# Patient Record
Sex: FEMALE | Race: WHITE | Employment: OTHER | URBAN - METROPOLITAN AREA
[De-identification: names, ages, dates, MRNs, and addresses within clinical notes are randomized per-mention and may not be internally consistent; named-entity substitution may affect disease eponyms.]

---

## 2020-03-11 ENCOUNTER — HOSPITAL ENCOUNTER (EMERGENCY)
Age: 75
Discharge: HOME | End: 2020-03-11
Attending: EMERGENCY MEDICINE
Payer: MEDICARE

## 2020-03-11 VITALS
HEIGHT: 61 IN | OXYGEN SATURATION: 97 % | WEIGHT: 129 LBS | BODY MASS INDEX: 24.35 KG/M2 | DIASTOLIC BLOOD PRESSURE: 74 MMHG | TEMPERATURE: 97.4 F | SYSTOLIC BLOOD PRESSURE: 146 MMHG | HEART RATE: 67 BPM | RESPIRATION RATE: 14 BRPM

## 2020-03-11 PROCEDURE — 99283 EMERGENCY DEPT VISIT LOW MDM: CPT

## 2020-03-11 RX ORDER — HYDROCODONE BITARTRATE AND ACETAMINOPHEN 5; 325 MG/1; MG/1
1 TABLET ORAL EVERY 6 HOURS PRN
Qty: 20 TABLET | Refills: 0 | Status: SHIPPED | OUTPATIENT
Start: 2020-03-11 | End: 2020-03-14

## 2020-03-11 ASSESSMENT — PAIN DESCRIPTION - ORIENTATION: ORIENTATION: MID

## 2020-03-11 ASSESSMENT — PAIN DESCRIPTION - FREQUENCY: FREQUENCY: CONTINUOUS

## 2020-03-11 ASSESSMENT — PAIN SCALES - GENERAL: PAINLEVEL_OUTOF10: 7

## 2020-03-11 ASSESSMENT — PAIN DESCRIPTION - DESCRIPTORS: DESCRIPTORS: BURNING

## 2020-03-11 ASSESSMENT — PAIN DESCRIPTION - LOCATION: LOCATION: FACE

## 2020-03-11 ASSESSMENT — PAIN DESCRIPTION - ONSET: ONSET: SUDDEN

## 2020-03-11 ASSESSMENT — PAIN DESCRIPTION - PROGRESSION: CLINICAL_PROGRESSION: NOT CHANGED

## 2020-03-11 ASSESSMENT — PAIN DESCRIPTION - PAIN TYPE: TYPE: ACUTE PAIN

## 2020-03-11 NOTE — ED PROVIDER NOTES
HPI:  3/11/20,   Time: 2:10 PM EDT         Stevenson Pham is a 76 y.o. female presenting to the ED for a thermal burn to the forehead, beginning 1d ago. The complaint has been persistent, moderate in severity, and worsened by nothing. The patient burned herself with hot steam at home yesterday and she could not sleep last night because of the pain    ROS:   Pertinent positives and negatives are stated within HPI, all other systems reviewed and are negative.  --------------------------------------------- PAST HISTORY ---------------------------------------------  Past Medical History:  has a past medical history of Hypertension. Past Surgical History:  has a past surgical history that includes Colonoscopy; Rotator cuff repair (Right); Hysterectomy; Tubal ligation; and Appendectomy. Social History:  reports that she has never smoked. She does not have any smokeless tobacco history on file. She reports that she does not drink alcohol or use drugs. Family History: family history is not on file. The patients home medications have been reviewed. Allergies: Pcn [penicillins]    -------------------------------------------------- RESULTS -------------------------------------------------  All laboratory and radiology results have been personally reviewed by myself   LABS:  No results found for this visit on 03/11/20. RADIOLOGY:  Interpreted by Radiologist.  No orders to display       ------------------------- NURSING NOTES AND VITALS REVIEWED ---------------------------   The nursing notes within the ED encounter and vital signs as below have been reviewed.    BP (!) 150/80   Pulse 70   Temp 97.4 °F (36.3 °C) (Oral)   Resp 14   Ht 5' 1\" (1.549 m)   Wt 129 lb (58.5 kg)   SpO2 97%   BMI 24.37 kg/m²   Oxygen Saturation Interpretation: Normal      ---------------------------------------------------PHYSICAL EXAM--------------------------------------      Constitutional/General: Alert and oriented x3, well appearing, non toxic in NAD  Head: NC/AT. There is a 3 x 4 cm second-degree burn on the upper part of the forehead  Eyes: PERRL, EOMI    Extremities: Moves all extremities x 4. Warm and well perfused  Skin: warm and dry without rash  Neurologic: GCS 15,  Psych: Normal Affect      ------------------------------ ED COURSE/MEDICAL DECISION MAKING----------------------  Medications - No data to display      Medical Decision Making:          Counseling: The emergency provider has spoken with the patient and discussed todays results, in addition to providing specific details for the plan of care and counseling regarding the diagnosis and prognosis. Questions are answered at this time and they are agreeable with the plan.      --------------------------------- IMPRESSION AND DISPOSITION ---------------------------------    IMPRESSION  1.  Superficial burn of forehead, initial encounter        DISPOSITION  Disposition: Discharge to home  Patient condition is stable                  Mica Chau MD  03/11/20 1367

## 2023-03-05 PROBLEM — G54.2 CERVICAL NEUROPATHY: Status: ACTIVE | Noted: 2023-03-05

## 2023-03-05 PROBLEM — M17.11 PRIMARY OSTEOARTHRITIS OF RIGHT KNEE: Status: ACTIVE | Noted: 2023-03-05

## 2023-03-05 PROBLEM — K59.00 CONSTIPATION: Status: ACTIVE | Noted: 2023-03-05

## 2023-03-05 PROBLEM — M54.2 NECK PAIN: Status: ACTIVE | Noted: 2023-03-05

## 2023-03-05 PROBLEM — B00.1 RECURRENT COLD SORES: Status: ACTIVE | Noted: 2023-03-05

## 2023-03-05 PROBLEM — F41.9 ANXIETY: Status: ACTIVE | Noted: 2023-03-05

## 2023-03-05 PROBLEM — M47.812 CERVICAL FACET SYNDROME: Status: ACTIVE | Noted: 2023-03-05

## 2023-03-05 PROBLEM — M53.9 MULTILEVEL DEGENERATIVE DISC DISEASE: Status: ACTIVE | Noted: 2023-03-05

## 2023-03-05 PROBLEM — M19.032 LOCALIZED PRIMARY OSTEOARTHRITIS OF CARPOMETACARPAL (CMC) JOINT OF LEFT WRIST: Status: ACTIVE | Noted: 2023-03-05

## 2023-03-05 PROBLEM — J30.9 ALLERGIC RHINITIS: Status: ACTIVE | Noted: 2023-03-05

## 2023-03-05 PROBLEM — E78.1 ESSENTIAL HYPERTRIGLYCERIDEMIA: Status: ACTIVE | Noted: 2023-03-05

## 2023-03-05 PROBLEM — I10 ESSENTIAL HYPERTENSION, BENIGN: Status: ACTIVE | Noted: 2023-03-05

## 2023-03-05 PROBLEM — G56.03 CARPAL TUNNEL SYNDROME, BILATERAL UPPER LIMBS: Status: ACTIVE | Noted: 2023-03-05

## 2023-03-05 PROBLEM — I73.9 PERIPHERAL VASCULAR DISEASE WITH CLAUDICATION (CMS-HCC): Status: ACTIVE | Noted: 2023-03-05

## 2023-03-05 PROBLEM — M19.90 ARTHRITIS: Status: ACTIVE | Noted: 2023-03-05

## 2023-03-05 PROBLEM — I83.90 VARICOSE VEINS OF LOWER EXTREMITY: Status: ACTIVE | Noted: 2023-03-05

## 2023-03-05 PROBLEM — R73.03 PREDIABETES: Status: ACTIVE | Noted: 2023-03-05

## 2023-03-05 PROBLEM — R80.9 MICROALBUMINURIA: Status: ACTIVE | Noted: 2023-03-05

## 2023-03-05 PROBLEM — E78.2 MIXED HYPERLIPIDEMIA: Status: ACTIVE | Noted: 2023-03-05

## 2023-03-05 PROBLEM — N28.1 RENAL CYST, LEFT: Status: ACTIVE | Noted: 2023-03-05

## 2023-03-05 PROBLEM — R01.1 CARDIAC MURMUR: Status: ACTIVE | Noted: 2023-03-05

## 2023-03-05 PROBLEM — K76.89 LIVER CYST: Status: ACTIVE | Noted: 2023-03-05

## 2023-03-05 PROBLEM — K21.9 GERD (GASTROESOPHAGEAL REFLUX DISEASE): Status: ACTIVE | Noted: 2023-03-05

## 2023-03-05 PROBLEM — F40.240 FEAR OF ENCLOSED SPACES: Status: ACTIVE | Noted: 2023-03-05

## 2023-03-05 PROBLEM — K58.2 IRRITABLE BOWEL SYNDROME WITH BOTH CONSTIPATION AND DIARRHEA: Status: ACTIVE | Noted: 2023-03-05

## 2023-03-05 PROBLEM — H60.63 CHRONIC OTITIS EXTERNA OF BOTH EARS: Status: ACTIVE | Noted: 2023-03-05

## 2023-03-05 PROBLEM — G44.86 CERVICOGENIC HEADACHE: Status: ACTIVE | Noted: 2023-03-05

## 2023-03-05 RX ORDER — ASPIRIN 81 MG/1
1 TABLET ORAL DAILY
COMMUNITY

## 2023-03-05 RX ORDER — VALACYCLOVIR HYDROCHLORIDE 1 G/1
2 TABLET, FILM COATED ORAL EVERY 12 HOURS
COMMUNITY
Start: 2022-11-17 | End: 2023-11-16 | Stop reason: SDUPTHER

## 2023-03-05 RX ORDER — EPINEPHRINE 0.3 MG/.3ML
0.3 INJECTION SUBCUTANEOUS ONCE
COMMUNITY
Start: 2020-05-26 | End: 2023-05-16 | Stop reason: SDUPTHER

## 2023-03-05 RX ORDER — LOSARTAN POTASSIUM 50 MG/1
1 TABLET ORAL 2 TIMES DAILY
COMMUNITY
Start: 2020-05-20 | End: 2023-05-16 | Stop reason: SDUPTHER

## 2023-03-05 RX ORDER — POLYETHYLENE GLYCOL 3350 17 G/17G
17 POWDER, FOR SOLUTION ORAL DAILY
COMMUNITY
Start: 2022-11-17 | End: 2023-11-16 | Stop reason: SDUPTHER

## 2023-03-05 RX ORDER — ATORVASTATIN CALCIUM 40 MG/1
1 TABLET, FILM COATED ORAL DAILY
COMMUNITY
Start: 2021-05-06 | End: 2023-05-16 | Stop reason: SDUPTHER

## 2023-03-05 RX ORDER — LORATADINE 10 MG/1
1 TABLET ORAL DAILY
COMMUNITY
Start: 2020-11-16 | End: 2023-05-16 | Stop reason: SDUPTHER

## 2023-03-05 RX ORDER — METOPROLOL SUCCINATE 50 MG/1
1 TABLET, EXTENDED RELEASE ORAL DAILY
COMMUNITY
Start: 2022-10-20 | End: 2023-06-06 | Stop reason: SDUPTHER

## 2023-03-05 RX ORDER — FLUTICASONE PROPIONATE 50 MCG
2 SPRAY, SUSPENSION (ML) NASAL DAILY
COMMUNITY
End: 2023-05-16 | Stop reason: SDUPTHER

## 2023-03-05 RX ORDER — CHLORTHALIDONE 25 MG/1
1 TABLET ORAL DAILY
COMMUNITY
Start: 2022-10-20 | End: 2023-06-06 | Stop reason: SDUPTHER

## 2023-03-05 RX ORDER — GUAIFENESIN 1200 MG
325 TABLET, EXTENDED RELEASE 12 HR ORAL EVERY 6 HOURS PRN
COMMUNITY

## 2023-03-05 RX ORDER — FAMOTIDINE 20 MG/1
1 TABLET, FILM COATED ORAL 2 TIMES DAILY
COMMUNITY
Start: 2020-10-08 | End: 2023-05-16 | Stop reason: SDUPTHER

## 2023-04-10 ENCOUNTER — CLINICAL SUPPORT (OUTPATIENT)
Dept: PRIMARY CARE | Facility: CLINIC | Age: 78
End: 2023-04-10
Payer: MEDICARE

## 2023-04-10 DIAGNOSIS — Z23 ENCOUNTER FOR IMMUNIZATION: Primary | ICD-10-CM

## 2023-04-10 PROBLEM — L85.3 DRY SKIN DERMATITIS: Status: ACTIVE | Noted: 2023-04-10

## 2023-04-10 PROBLEM — R10.9 ABDOMINAL PAIN: Status: ACTIVE | Noted: 2023-04-10

## 2023-04-10 PROBLEM — N39.0 URINARY TRACT INFECTION: Status: ACTIVE | Noted: 2023-04-10

## 2023-04-10 PROCEDURE — 91312 PFIZER SARS-COV-2 BIVALENT VACCINE 30 MCG/0.3 ML: CPT | Performed by: FAMILY MEDICINE

## 2023-04-10 PROCEDURE — 0124A PFIZER SARS-COV-2 BIVALENT VACCINE 30 MCG/0.3 ML: CPT | Performed by: FAMILY MEDICINE

## 2023-04-10 PROCEDURE — 99211 OFF/OP EST MAY X REQ PHY/QHP: CPT | Performed by: FAMILY MEDICINE

## 2023-04-10 RX ORDER — CLOTRIMAZOLE AND BETAMETHASONE DIPROPIONATE 10; .64 MG/G; MG/G
CREAM TOPICAL
COMMUNITY
Start: 2022-05-26 | End: 2023-05-16 | Stop reason: SDUPTHER

## 2023-05-16 ENCOUNTER — OFFICE VISIT (OUTPATIENT)
Dept: PRIMARY CARE | Facility: CLINIC | Age: 78
End: 2023-05-16
Payer: MEDICARE

## 2023-05-16 VITALS
BODY MASS INDEX: 22.47 KG/M2 | TEMPERATURE: 98 F | SYSTOLIC BLOOD PRESSURE: 150 MMHG | HEIGHT: 61 IN | RESPIRATION RATE: 16 BRPM | OXYGEN SATURATION: 96 % | DIASTOLIC BLOOD PRESSURE: 90 MMHG | WEIGHT: 119 LBS | HEART RATE: 58 BPM

## 2023-05-16 DIAGNOSIS — I10 ESSENTIAL HYPERTENSION, BENIGN: ICD-10-CM

## 2023-05-16 DIAGNOSIS — K21.9 GASTROESOPHAGEAL REFLUX DISEASE WITHOUT ESOPHAGITIS: ICD-10-CM

## 2023-05-16 DIAGNOSIS — Z91.030 BEE STING ALLERGY: ICD-10-CM

## 2023-05-16 DIAGNOSIS — F41.9 ANXIETY: ICD-10-CM

## 2023-05-16 DIAGNOSIS — B00.1 RECURRENT COLD SORES: ICD-10-CM

## 2023-05-16 DIAGNOSIS — R73.03 PREDIABETES: ICD-10-CM

## 2023-05-16 DIAGNOSIS — K59.04 CHRONIC IDIOPATHIC CONSTIPATION: ICD-10-CM

## 2023-05-16 DIAGNOSIS — H60.63 CHRONIC OTITIS EXTERNA OF BOTH EARS, UNSPECIFIED TYPE: ICD-10-CM

## 2023-05-16 DIAGNOSIS — M19.90 ARTHRITIS: ICD-10-CM

## 2023-05-16 DIAGNOSIS — J30.9 ALLERGIC RHINITIS, UNSPECIFIED SEASONALITY, UNSPECIFIED TRIGGER: ICD-10-CM

## 2023-05-16 DIAGNOSIS — I73.9 PERIPHERAL VASCULAR DISEASE WITH CLAUDICATION (CMS-HCC): Primary | ICD-10-CM

## 2023-05-16 DIAGNOSIS — E78.2 MIXED HYPERLIPIDEMIA: ICD-10-CM

## 2023-05-16 DIAGNOSIS — E55.9 VITAMIN D DEFICIENCY: ICD-10-CM

## 2023-05-16 DIAGNOSIS — E78.1 ESSENTIAL HYPERTRIGLYCERIDEMIA: ICD-10-CM

## 2023-05-16 DIAGNOSIS — M53.9 MULTILEVEL DEGENERATIVE DISC DISEASE: ICD-10-CM

## 2023-05-16 DIAGNOSIS — L85.3 DRY SKIN DERMATITIS: ICD-10-CM

## 2023-05-16 PROBLEM — N39.0 URINARY TRACT INFECTION: Status: RESOLVED | Noted: 2023-04-10 | Resolved: 2023-05-16

## 2023-05-16 PROBLEM — M54.2 NECK PAIN: Status: RESOLVED | Noted: 2023-03-05 | Resolved: 2023-05-16

## 2023-05-16 PROBLEM — R10.9 ABDOMINAL PAIN: Status: RESOLVED | Noted: 2023-04-10 | Resolved: 2023-05-16

## 2023-05-16 PROCEDURE — 1160F RVW MEDS BY RX/DR IN RCRD: CPT | Performed by: FAMILY MEDICINE

## 2023-05-16 PROCEDURE — 1159F MED LIST DOCD IN RCRD: CPT | Performed by: FAMILY MEDICINE

## 2023-05-16 PROCEDURE — 3080F DIAST BP >= 90 MM HG: CPT | Performed by: FAMILY MEDICINE

## 2023-05-16 PROCEDURE — 3077F SYST BP >= 140 MM HG: CPT | Performed by: FAMILY MEDICINE

## 2023-05-16 PROCEDURE — 99214 OFFICE O/P EST MOD 30 MIN: CPT | Performed by: FAMILY MEDICINE

## 2023-05-16 PROCEDURE — 1036F TOBACCO NON-USER: CPT | Performed by: FAMILY MEDICINE

## 2023-05-16 RX ORDER — LOSARTAN POTASSIUM 50 MG/1
50 TABLET ORAL 2 TIMES DAILY
Qty: 180 TABLET | Refills: 1 | Status: SHIPPED | OUTPATIENT
Start: 2023-05-16 | End: 2023-09-18

## 2023-05-16 RX ORDER — FAMOTIDINE 20 MG/1
20 TABLET, FILM COATED ORAL 2 TIMES DAILY
Qty: 180 TABLET | Refills: 1 | Status: SHIPPED | OUTPATIENT
Start: 2023-05-16 | End: 2024-02-21 | Stop reason: WASHOUT

## 2023-05-16 RX ORDER — FLUTICASONE PROPIONATE 50 MCG
2 SPRAY, SUSPENSION (ML) NASAL DAILY
Qty: 48 G | Refills: 1 | Status: SHIPPED | OUTPATIENT
Start: 2023-05-16 | End: 2024-03-12

## 2023-05-16 RX ORDER — EPINEPHRINE 0.3 MG/.3ML
0.3 INJECTION SUBCUTANEOUS ONCE
Qty: 2 EACH | Refills: 0 | Status: SHIPPED | OUTPATIENT
Start: 2023-05-16 | End: 2024-02-21 | Stop reason: SDUPTHER

## 2023-05-16 RX ORDER — CLOTRIMAZOLE AND BETAMETHASONE DIPROPIONATE 10; .64 MG/G; MG/G
CREAM TOPICAL 2 TIMES DAILY
Qty: 45 G | Refills: 1 | Status: SHIPPED | OUTPATIENT
Start: 2023-05-16 | End: 2023-11-12

## 2023-05-16 RX ORDER — ATORVASTATIN CALCIUM 40 MG/1
40 TABLET, FILM COATED ORAL DAILY
Qty: 90 TABLET | Refills: 1 | Status: SHIPPED | OUTPATIENT
Start: 2023-05-16 | End: 2024-03-12

## 2023-05-16 RX ORDER — LORATADINE 10 MG/1
10 TABLET ORAL DAILY
Qty: 90 TABLET | Refills: 1 | Status: SHIPPED | OUTPATIENT
Start: 2023-05-16

## 2023-05-16 RX ORDER — ATENOLOL 50 MG/1
50 TABLET ORAL DAILY
COMMUNITY
End: 2023-06-26 | Stop reason: WASHOUT

## 2023-05-16 RX ORDER — HYDROCHLOROTHIAZIDE 25 MG/1
25 TABLET ORAL DAILY
COMMUNITY
End: 2023-06-26 | Stop reason: WASHOUT

## 2023-05-16 ASSESSMENT — ENCOUNTER SYMPTOMS
CHILLS: 0
NUMBNESS: 0
CONFUSION: 0
HEMATURIA: 0
SORE THROAT: 0
FREQUENCY: 0
SINUS PRESSURE: 0
VOMITING: 0
CHEST TIGHTNESS: 0
DIARRHEA: 0
CONSTIPATION: 0
ADENOPATHY: 0
COUGH: 0
DYSPHORIC MOOD: 0
HEADACHES: 0
DYSURIA: 0
UNEXPECTED WEIGHT CHANGE: 0
DIAPHORESIS: 0
NAUSEA: 0
SINUS PAIN: 0
NERVOUS/ANXIOUS: 0
POLYDIPSIA: 0
SHORTNESS OF BREATH: 0
POLYPHAGIA: 0
WHEEZING: 0
ABDOMINAL PAIN: 0
LIGHT-HEADEDNESS: 0
DIZZINESS: 0
FEVER: 0
PALPITATIONS: 0

## 2023-05-16 ASSESSMENT — PATIENT HEALTH QUESTIONNAIRE - PHQ9
2. FEELING DOWN, DEPRESSED OR HOPELESS: NOT AT ALL
1. LITTLE INTEREST OR PLEASURE IN DOING THINGS: NOT AT ALL
SUM OF ALL RESPONSES TO PHQ9 QUESTIONS 1 AND 2: 0

## 2023-05-16 NOTE — ASSESSMENT & PLAN NOTE
>>ASSESSMENT AND PLAN FOR CONSTIPATION WRITTEN ON 5/16/2023  3:42 PM BY ANA MONTANO MD    -continue miralax

## 2023-05-16 NOTE — PATIENT INSTRUCTIONS
Deborah Boston ,    Thank you for coming in today. We at [facility] appreciate your trust in our care. If you have any questions or concerns about the care you received today, please do not hesitate to contact us at [phone].    The following instructions were discussed today:    - check blood pressure at home once daily over the next two weeks and then call the office with readings. Goal blood pressure for you is top number 120s to 130s over bottom number of 70s to 80s   - Follow up in 6 months   - Please get blood work done 1-2 weeks prior to your next visit.   - For blood work: Nothing to eat or drink for at least 10 hours prior. Okay for water or black coffee.

## 2023-05-16 NOTE — ASSESSMENT & PLAN NOTE
- blood pressure elevated today. Looking at blood pressure readings here over the past 6 months or so, blood pressure has been running 120s over 70s  - Denies chest pain and shortness of breath.   -on chlorthalidone, losartan, metoprolol  - continue current medications and will have her call with blood pressure results in 2 weeks

## 2023-05-16 NOTE — PROGRESS NOTES
"Subjective   Patient ID: Deborah Boston is a 77 y.o. female who presents for medication refills.    routine follow up. chronic issues as per assessment and plan.     Went to see podiatry and was diagnosed with plantar warts. He did remove them. Gave her 47% urea gel to put on her feet.          Review of Systems   Constitutional:  Negative for chills, diaphoresis, fever and unexpected weight change.   HENT:  Negative for congestion, sinus pressure, sinus pain, sneezing and sore throat.    Respiratory:  Negative for cough, chest tightness, shortness of breath and wheezing.    Cardiovascular:  Negative for chest pain, palpitations and leg swelling.   Gastrointestinal:  Negative for abdominal pain, constipation, diarrhea, nausea and vomiting.   Endocrine: Negative for cold intolerance, heat intolerance, polydipsia, polyphagia and polyuria.   Genitourinary:  Negative for dysuria, frequency, hematuria and urgency.   Neurological:  Negative for dizziness, syncope, light-headedness, numbness and headaches.   Hematological:  Negative for adenopathy.   Psychiatric/Behavioral:  Negative for confusion and dysphoric mood. The patient is not nervous/anxious.        Objective   /90   Pulse 58   Temp 36.7 °C (98 °F)   Resp 16   Ht 1.549 m (5' 1\")   Wt 54 kg (119 lb)   SpO2 96%   BMI 22.48 kg/m²     Physical Exam  Vitals and nursing note reviewed.   Constitutional:       General: She is not in acute distress.     Appearance: Normal appearance.   HENT:      Head: Normocephalic and atraumatic.      Nose: Nose normal.   Eyes:      Extraocular Movements: Extraocular movements intact.      Conjunctiva/sclera: Conjunctivae normal.      Pupils: Pupils are equal, round, and reactive to light.   Cardiovascular:      Rate and Rhythm: Normal rate and regular rhythm.      Heart sounds: No murmur heard.     No friction rub. No gallop.   Pulmonary:      Effort: Pulmonary effort is normal.      Breath sounds: Normal breath " sounds. No wheezing, rhonchi or rales.   Abdominal:      General: Bowel sounds are normal. There is no distension.      Palpations: Abdomen is soft.      Tenderness: There is no abdominal tenderness.   Musculoskeletal:         General: Normal range of motion.      Cervical back: Normal range of motion and neck supple.   Skin:     General: Skin is warm and dry.   Neurological:      General: No focal deficit present.      Mental Status: She is alert and oriented to person, place, and time.      Deep Tendon Reflexes: Reflexes normal.   Psychiatric:         Mood and Affect: Mood normal.         Behavior: Behavior normal.         Thought Content: Thought content normal.         Judgment: Judgment normal.         Assessment/Plan   Problem List Items Addressed This Visit       Allergic rhinitis     --continue Flonase and loratadine          Relevant Medications    fluticasone (Flonase) 50 mcg/actuation nasal spray    loratadine (Claritin) 10 mg tablet    Other Relevant Orders    CBC and Auto Differential    Comprehensive Metabolic Panel    TSH with reflex to Free T4 if abnormal    Anxiety     --feels it is manageable at this point without medication. Will continue to monitor         Relevant Orders    CBC and Auto Differential    Comprehensive Metabolic Panel    TSH with reflex to Free T4 if abnormal    Arthritis     -continue acetaminophen as needed.         Relevant Orders    CBC and Auto Differential    Comprehensive Metabolic Panel    TSH with reflex to Free T4 if abnormal    Bee sting allergy    Relevant Medications    EPINEPHrine 0.3 mg/0.3 mL injection syringe    Chronic otitis externa of both ears    Relevant Medications    clotrimazole-betamethasone (Lotrisone) cream    Constipation     -continue miralax         Relevant Orders    CBC and Auto Differential    Comprehensive Metabolic Panel    TSH with reflex to Free T4 if abnormal    Dry skin dermatitis     -fluocinonide as needed         Relevant Orders    CBC and  Auto Differential    Comprehensive Metabolic Panel    TSH with reflex to Free T4 if abnormal    Essential hypertension, benign     - blood pressure elevated today. Looking at blood pressure readings here over the past 6 months or so, blood pressure has been running 120s over 70s  - Denies chest pain and shortness of breath.   -on chlorthalidone, losartan, metoprolol  - continue current medications and will have her call with blood pressure results in 2 weeks          Relevant Medications    losartan (Cozaar) 50 mg tablet    Other Relevant Orders    CBC and Auto Differential    Comprehensive Metabolic Panel    TSH with reflex to Free T4 if abnormal    Follow Up In Primary Care    Essential hypertriglyceridemia     -continue atorvastatin         Relevant Orders    CBC and Auto Differential    Comprehensive Metabolic Panel    TSH with reflex to Free T4 if abnormal    Gastroesophageal reflux disease without esophagitis     -stable, continue famotidine         Relevant Medications    famotidine (Pepcid) 20 mg tablet    Other Relevant Orders    CBC and Auto Differential    Comprehensive Metabolic Panel    TSH with reflex to Free T4 if abnormal    Mixed hyperlipidemia     -continue atorvastatin         Relevant Medications    atorvastatin (Lipitor) 40 mg tablet    Other Relevant Orders    CBC and Auto Differential    Comprehensive Metabolic Panel    Lipid Panel    TSH with reflex to Free T4 if abnormal    Multilevel degenerative disc disease     -continue acetaminophen as needed         Relevant Orders    CBC and Auto Differential    Comprehensive Metabolic Panel    TSH with reflex to Free T4 if abnormal    Peripheral vascular disease with claudication (CMS/[facility]) - Primary     -continue daily aspirin, statin  - ABIs lower extremities 2/2023 were normal          Relevant Orders    CBC and Auto Differential    Comprehensive Metabolic Panel    TSH with reflex to Free T4 if abnormal    Prediabetes     - Encouraged healthy  lifestyle, including adequate exercise and high fiber, low fat and low carb diet.          Relevant Orders    Hemoglobin A1C    CBC and Auto Differential    Comprehensive Metabolic Panel    TSH with reflex to Free T4 if abnormal    Recurrent cold sores     -continue valtrex as needed for flare.         Relevant Orders    CBC and Auto Differential    Comprehensive Metabolic Panel    TSH with reflex to Free T4 if abnormal     Other Visit Diagnoses       Vitamin D deficiency        Relevant Orders    Vitamin D, Total    CBC and Auto Differential    Comprehensive Metabolic Panel    TSH with reflex to Free T4 if abnormal

## 2023-06-02 ENCOUNTER — TELEPHONE (OUTPATIENT)
Dept: PRIMARY CARE | Facility: CLINIC | Age: 78
End: 2023-06-02
Payer: MEDICARE

## 2023-06-02 DIAGNOSIS — I10 ESSENTIAL HYPERTENSION, BENIGN: Primary | ICD-10-CM

## 2023-06-06 RX ORDER — METOPROLOL SUCCINATE 50 MG/1
50 TABLET, EXTENDED RELEASE ORAL DAILY
Qty: 90 TABLET | Refills: 1 | Status: SHIPPED | OUTPATIENT
Start: 2023-06-06 | End: 2023-11-08

## 2023-06-06 RX ORDER — CHLORTHALIDONE 25 MG/1
25 TABLET ORAL DAILY
Qty: 90 TABLET | Refills: 1 | Status: SHIPPED | OUTPATIENT
Start: 2023-06-06 | End: 2023-06-26 | Stop reason: SDUPTHER

## 2023-06-20 ENCOUNTER — CLINICAL SUPPORT (OUTPATIENT)
Dept: PRIMARY CARE | Facility: CLINIC | Age: 78
End: 2023-06-20
Payer: MEDICARE

## 2023-06-20 DIAGNOSIS — R39.15 URINARY URGENCY: ICD-10-CM

## 2023-06-20 LAB
POC APPEARANCE, URINE: ABNORMAL
POC BILIRUBIN, URINE: NEGATIVE
POC BLOOD, URINE: NEGATIVE
POC COLOR, URINE: YELLOW
POC GLUCOSE, URINE: NEGATIVE MG/DL
POC KETONES, URINE: NEGATIVE MG/DL
POC LEUKOCYTES, URINE: ABNORMAL
POC NITRITE,URINE: NEGATIVE
POC PH, URINE: 6 PH
POC PROTEIN, URINE: NEGATIVE MG/DL
POC SPECIFIC GRAVITY, URINE: 1.01
POC UROBILINOGEN, URINE: 0.2 EU/DL

## 2023-06-20 PROCEDURE — 81003 URINALYSIS AUTO W/O SCOPE: CPT | Performed by: FAMILY MEDICINE

## 2023-06-20 PROCEDURE — 87086 URINE CULTURE/COLONY COUNT: CPT

## 2023-06-20 RX ORDER — NITROFURANTOIN 25; 75 MG/1; MG/1
100 CAPSULE ORAL 2 TIMES DAILY
Qty: 14 CAPSULE | Refills: 0 | Status: SHIPPED | OUTPATIENT
Start: 2023-06-20 | End: 2023-06-26 | Stop reason: ALTCHOICE

## 2023-06-22 LAB — URINE CULTURE: NORMAL

## 2023-06-26 ENCOUNTER — OFFICE VISIT (OUTPATIENT)
Dept: PRIMARY CARE | Facility: CLINIC | Age: 78
End: 2023-06-26
Payer: MEDICARE

## 2023-06-26 VITALS
RESPIRATION RATE: 16 BRPM | TEMPERATURE: 98 F | HEART RATE: 76 BPM | OXYGEN SATURATION: 98 % | WEIGHT: 116 LBS | SYSTOLIC BLOOD PRESSURE: 124 MMHG | DIASTOLIC BLOOD PRESSURE: 72 MMHG | BODY MASS INDEX: 21.9 KG/M2 | HEIGHT: 61 IN

## 2023-06-26 DIAGNOSIS — R35.0 FREQUENCY OF URINATION AND POLYURIA: Primary | ICD-10-CM

## 2023-06-26 DIAGNOSIS — I10 ESSENTIAL HYPERTENSION, BENIGN: ICD-10-CM

## 2023-06-26 DIAGNOSIS — R73.9 HYPERGLYCEMIA: ICD-10-CM

## 2023-06-26 DIAGNOSIS — R35.89 FREQUENCY OF URINATION AND POLYURIA: Primary | ICD-10-CM

## 2023-06-26 DIAGNOSIS — N32.81 OVERACTIVE BLADDER: ICD-10-CM

## 2023-06-26 PROBLEM — Z86.010 HISTORY OF COLONIC POLYPS: Status: RESOLVED | Noted: 2023-06-26 | Resolved: 2023-06-26

## 2023-06-26 PROBLEM — Z86.0100 HISTORY OF COLONIC POLYPS: Status: RESOLVED | Noted: 2023-06-26 | Resolved: 2023-06-26

## 2023-06-26 LAB
POC APPEARANCE, URINE: ABNORMAL
POC BILIRUBIN, URINE: NEGATIVE
POC BLOOD, URINE: NEGATIVE
POC COLOR, URINE: YELLOW
POC FINGERSTICK BLOOD GLUCOSE: 164 MG/DL (ref 70–100)
POC GLUCOSE, URINE: NEGATIVE MG/DL
POC HEMOGLOBIN A1C: 5.5 % (ref 4.2–6.5)
POC KETONES, URINE: NEGATIVE MG/DL
POC LEUKOCYTES, URINE: ABNORMAL
POC NITRITE,URINE: NEGATIVE
POC PH, URINE: 7.5 PH
POC PROTEIN, URINE: NEGATIVE MG/DL
POC SPECIFIC GRAVITY, URINE: 1.01
POC UROBILINOGEN, URINE: 0.2 EU/DL

## 2023-06-26 PROCEDURE — 83036 HEMOGLOBIN GLYCOSYLATED A1C: CPT | Performed by: FAMILY MEDICINE

## 2023-06-26 PROCEDURE — 3078F DIAST BP <80 MM HG: CPT | Performed by: FAMILY MEDICINE

## 2023-06-26 PROCEDURE — 1036F TOBACCO NON-USER: CPT | Performed by: FAMILY MEDICINE

## 2023-06-26 PROCEDURE — 1160F RVW MEDS BY RX/DR IN RCRD: CPT | Performed by: FAMILY MEDICINE

## 2023-06-26 PROCEDURE — 3074F SYST BP LT 130 MM HG: CPT | Performed by: FAMILY MEDICINE

## 2023-06-26 PROCEDURE — 82962 GLUCOSE BLOOD TEST: CPT | Performed by: FAMILY MEDICINE

## 2023-06-26 PROCEDURE — 87086 URINE CULTURE/COLONY COUNT: CPT

## 2023-06-26 PROCEDURE — 1159F MED LIST DOCD IN RCRD: CPT | Performed by: FAMILY MEDICINE

## 2023-06-26 PROCEDURE — 99213 OFFICE O/P EST LOW 20 MIN: CPT | Performed by: FAMILY MEDICINE

## 2023-06-26 PROCEDURE — 81003 URINALYSIS AUTO W/O SCOPE: CPT | Performed by: FAMILY MEDICINE

## 2023-06-26 RX ORDER — OXYBUTYNIN CHLORIDE 5 MG/1
5 TABLET, EXTENDED RELEASE ORAL DAILY
Qty: 30 TABLET | Refills: 2 | Status: SHIPPED | OUTPATIENT
Start: 2023-06-26 | End: 2023-07-28 | Stop reason: SDUPTHER

## 2023-06-26 RX ORDER — CREAM BASE NO.31
CREAM (GRAM) MISCELLANEOUS
COMMUNITY

## 2023-06-26 RX ORDER — CHLORTHALIDONE 25 MG/1
25 TABLET ORAL DAILY
Qty: 90 TABLET | Refills: 1 | Status: SHIPPED | OUTPATIENT
Start: 2023-06-26 | End: 2024-03-12

## 2023-06-26 ASSESSMENT — ENCOUNTER SYMPTOMS
SORE THROAT: 0
LIGHT-HEADEDNESS: 0
UNEXPECTED WEIGHT CHANGE: 0
SINUS PAIN: 0
FLANK PAIN: 0
VOMITING: 0
CHEST TIGHTNESS: 0
DIAPHORESIS: 0
SHORTNESS OF BREATH: 0
HEMATURIA: 0
CONFUSION: 0
ABDOMINAL PAIN: 0
SINUS PRESSURE: 0
ADENOPATHY: 0
NERVOUS/ANXIOUS: 0
PALPITATIONS: 0
DIARRHEA: 0
CONSTIPATION: 0
FREQUENCY: 0
FEVER: 0
CHILLS: 0
HEADACHES: 0
DYSURIA: 0
DIZZINESS: 0
COUGH: 0
NAUSEA: 0
NUMBNESS: 0
DYSPHORIC MOOD: 0
DIFFICULTY URINATING: 0
POLYPHAGIA: 0
WHEEZING: 0
POLYDIPSIA: 0

## 2023-06-26 ASSESSMENT — PATIENT HEALTH QUESTIONNAIRE - PHQ9
1. LITTLE INTEREST OR PLEASURE IN DOING THINGS: NOT AT ALL
2. FEELING DOWN, DEPRESSED OR HOPELESS: NOT AT ALL
SUM OF ALL RESPONSES TO PHQ9 QUESTIONS 1 AND 2: 0

## 2023-06-26 NOTE — PROGRESS NOTES
Subjective   Patient ID: Deborah Boston is a 77 y.o. female who presents for trouble holding urine (Antibiotic she's been on for the last UTI gives her a really bad headache but she is almost done with it.//Urinary frequency and lots of urine out put without increasing fluid intake within the past 3-4 wks.  Has been dx with pre-diabetes).    Here today over concerns for frequent urination. Was here in the office 6/20/23 for nurse visit. UA at that time had small LE. I treated her with macrobid, which she will be finishing in about 2 days. Culture actuallty came back no growth. UA today with large LE. Will be sending that for culture.     Has been having issues with frequent urination for about 3 weeks now. States was having pain with urination at first but she no longer is. Denies increased fluid intake. She states she is getting up a night to urinate. Sometimes not making it to the bathroom in time. Denies urine leakage with cough or sneezing. Does have history of prediabetes. Glucose today is 164. She is approximately 3 hours postprandial. Denies any vision changes. Denies any nausea or vomiting.          Office Visit on 06/26/2023   Component Date Value Ref Range Status    POC Color, Urine 06/26/2023 Yellow  Straw, Yellow, Light Yellow Final    POC Appearance, Urine 06/26/2023 Hazy (A)  Clear Final    POC Specific Gravity, Urine 06/26/2023 1.015  1.005 - 1.035 Final    POC PH, Urine 06/26/2023 7.5  No Reference Range Established PH Final    POC Protein, Urine 06/26/2023 NEGATIVE  NEGATIVE, 30 (1+) mg/dl Final    POC Glucose, Urine 06/26/2023 NEGATIVE  NEGATIVE mg/dl Final    POC Blood, Urine 06/26/2023 NEGATIVE  NEGATIVE Final    POC Ketones, Urine 06/26/2023 NEGATIVE  NEGATIVE mg/dl Final    POC Bilirubin, Urine 06/26/2023 NEGATIVE  NEGATIVE Final    POC Urobilinogen, Urine 06/26/2023 0.2  0.2, 1.0 EU/DL Final    Poc Nitrate, Urine 06/26/2023 NEGATIVE  NEGATIVE Final    POC Leukocytes, Urine 06/26/2023 LARGE  "(3+) (A)  NEGATIVE Final    POC Fingerstick Blood Glucose 06/26/2023 164 (A)  70 - 100 mg/dl Final    POC HEMOGLOBIN A1c 06/26/2023 5.5  4.2 - 6.5 % Final        Review of Systems   Constitutional:  Negative for chills, diaphoresis, fever and unexpected weight change.   HENT:  Negative for congestion, sinus pressure, sinus pain, sneezing and sore throat.    Respiratory:  Negative for cough, chest tightness, shortness of breath and wheezing.    Cardiovascular:  Negative for chest pain, palpitations and leg swelling.   Gastrointestinal:  Negative for abdominal pain, constipation, diarrhea, nausea and vomiting.   Endocrine: Negative for cold intolerance, heat intolerance, polydipsia, polyphagia and polyuria.   Genitourinary:  Positive for urgency. Negative for difficulty urinating, dysuria, flank pain, frequency and hematuria.   Neurological:  Negative for dizziness, syncope, light-headedness, numbness and headaches.   Hematological:  Negative for adenopathy.   Psychiatric/Behavioral:  Negative for confusion and dysphoric mood. The patient is not nervous/anxious.        Objective   /72   Pulse 76   Temp 36.7 °C (98 °F)   Resp 16   Ht 1.549 m (5' 1\")   Wt 52.6 kg (116 lb)   SpO2 98%   BMI 21.92 kg/m²     Physical Exam  Vitals and nursing note reviewed.   Constitutional:       General: She is not in acute distress.     Appearance: Normal appearance.   HENT:      Head: Normocephalic and atraumatic.      Nose: Nose normal.   Eyes:      Extraocular Movements: Extraocular movements intact.      Conjunctiva/sclera: Conjunctivae normal.      Pupils: Pupils are equal, round, and reactive to light.   Cardiovascular:      Rate and Rhythm: Normal rate and regular rhythm.      Heart sounds: No murmur heard.     No friction rub. No gallop.   Pulmonary:      Effort: Pulmonary effort is normal.      Breath sounds: Normal breath sounds. No wheezing, rhonchi or rales.   Abdominal:      General: Bowel sounds are normal. " There is no distension.      Palpations: Abdomen is soft.      Tenderness: There is no abdominal tenderness.   Musculoskeletal:         General: Normal range of motion.      Cervical back: Normal range of motion and neck supple.   Skin:     General: Skin is warm and dry.   Neurological:      General: No focal deficit present.      Mental Status: She is alert and oriented to person, place, and time.      Deep Tendon Reflexes: Reflexes normal.   Psychiatric:         Mood and Affect: Mood normal.         Behavior: Behavior normal.         Thought Content: Thought content normal.         Judgment: Judgment normal.         Assessment/Plan   Problem List Items Addressed This Visit       Essential hypertension, benign    Relevant Medications    chlorthalidone (Hygroton) 25 mg tablet    Frequency of urination and polyuria - Primary     - UA today with large LE. Will send for culture  - UA from 6/20/23 with small LE and she was treated with macrobid. Urine culture with that UA was negative, so she can stop the macrobid  - non-fasting glucose today was 164 but HbA1c 5.5 so not diabetes  - suspect overactive bladder   - START oxybutynin XL 5 mg daily          Relevant Orders    POCT UA Automated manually resulted (Completed)    Urine Culture    POCT fingerstick glucose manually resulted (Completed)    Follow Up In Primary Care    Hyperglycemia     - blood sugar today 164. She is about 3 hours postprandial  - HbA1c 5.5         Relevant Orders    POCT glycosylated hemoglobin (Hb A1C) manually resulted (Completed)    Overactive bladder     - trial oxybutynin XL 5 mg daily          Relevant Medications    oxybutynin XL (Ditropan XL) 5 mg 24 hr tablet    Other Relevant Orders    Follow Up In Primary Care

## 2023-06-26 NOTE — PATIENT INSTRUCTIONS
Deborah Boston ,    Thank you for coming in today. We at [facility] appreciate your trust in our care. If you have any questions or concerns about the care you received today, please do not hesitate to contact us at [phone].    The following instructions were discussed today:    - STOP macrobid   - START oxybutynin XL 5 mg daily   - Follow up in 1 month for virtual

## 2023-06-28 LAB — URINE CULTURE: NORMAL

## 2023-07-28 ENCOUNTER — TELEMEDICINE (OUTPATIENT)
Dept: PRIMARY CARE | Facility: CLINIC | Age: 78
End: 2023-07-28
Payer: MEDICARE

## 2023-07-28 DIAGNOSIS — N32.81 OVERACTIVE BLADDER: ICD-10-CM

## 2023-07-28 PROBLEM — R35.0 FREQUENCY OF URINATION AND POLYURIA: Status: RESOLVED | Noted: 2023-06-26 | Resolved: 2023-07-28

## 2023-07-28 PROBLEM — R35.89 FREQUENCY OF URINATION AND POLYURIA: Status: RESOLVED | Noted: 2023-06-26 | Resolved: 2023-07-28

## 2023-07-28 PROCEDURE — 99442 PR PHYS/QHP TELEPHONE EVALUATION 11-20 MIN: CPT | Performed by: FAMILY MEDICINE

## 2023-07-28 RX ORDER — OXYBUTYNIN CHLORIDE 5 MG/1
5 TABLET, EXTENDED RELEASE ORAL DAILY
Qty: 90 TABLET | Refills: 1 | Status: SHIPPED | OUTPATIENT
Start: 2023-07-28 | End: 2024-01-24 | Stop reason: WASHOUT

## 2023-07-28 ASSESSMENT — ENCOUNTER SYMPTOMS
NAUSEA: 0
CHILLS: 0
HEMATURIA: 0
CHEST TIGHTNESS: 0
WHEEZING: 0
POLYDIPSIA: 0
SINUS PRESSURE: 0
CONFUSION: 0
UNEXPECTED WEIGHT CHANGE: 0
ABDOMINAL PAIN: 0
DIARRHEA: 0
SINUS PAIN: 0
COUGH: 0
POLYPHAGIA: 0
SHORTNESS OF BREATH: 0
CONSTIPATION: 0
DYSPHORIC MOOD: 0
SORE THROAT: 0
FREQUENCY: 0
NUMBNESS: 0
VOMITING: 0
NERVOUS/ANXIOUS: 0
DYSURIA: 0
FEVER: 0
DIZZINESS: 0
ADENOPATHY: 0
PALPITATIONS: 0
LIGHT-HEADEDNESS: 0
DIAPHORESIS: 0
HEADACHES: 0

## 2023-07-28 NOTE — PATIENT INSTRUCTIONS
Deborah Boston ,    Thank you for coming in today. We at [facility] appreciate your trust in our care. If you have any questions or concerns about the care you received today, please do not hesitate to contact us at [phone].    The following instructions were discussed today:    - Follow up 11/16/2023 as scheduled.

## 2023-07-28 NOTE — PROGRESS NOTES
Subjective   Patient ID: Deborah Boston is a 78 y.o. female who presents for Urinary Frequency.    Virtual or Telephone Consent    A telephone visit (audio only) between the patient (at the originating site) and the provider (at the distant site) was utilized to provide this telehealth service.   Verbal consent was requested and obtained from Deborah Boston on this date, 07/28/23 for a telehealth visit.      Follow up on frequent urination. Urine culture was negative. She was started on oxybutynin at last visit. She states she is doing better. Urine is less frequent.          Review of Systems   Constitutional:  Negative for chills, diaphoresis, fever and unexpected weight change.   HENT:  Negative for congestion, sinus pressure, sinus pain, sneezing and sore throat.    Respiratory:  Negative for cough, chest tightness, shortness of breath and wheezing.    Cardiovascular:  Negative for chest pain, palpitations and leg swelling.   Gastrointestinal:  Negative for abdominal pain, constipation, diarrhea, nausea and vomiting.   Endocrine: Negative for cold intolerance, heat intolerance, polydipsia, polyphagia and polyuria.   Genitourinary:  Negative for dysuria, frequency, hematuria and urgency.   Neurological:  Negative for dizziness, syncope, light-headedness, numbness and headaches.   Hematological:  Negative for adenopathy.   Psychiatric/Behavioral:  Negative for confusion and dysphoric mood. The patient is not nervous/anxious.            Assessment/Plan   Problem List Items Addressed This Visit       Overactive bladder     - improved. Continue oxybuynin          Relevant Medications    oxybutynin XL (Ditropan XL) 5 mg 24 hr tablet          This telephone visit lasted approximately 11 minutes.

## 2023-09-12 ENCOUNTER — HOSPITAL ENCOUNTER (OUTPATIENT)
Dept: DATA CONVERSION | Facility: HOSPITAL | Age: 78
End: 2023-09-12
Attending: ORTHOPAEDIC SURGERY | Admitting: ORTHOPAEDIC SURGERY
Payer: MEDICARE

## 2023-09-12 DIAGNOSIS — G56.01 CARPAL TUNNEL SYNDROME, RIGHT UPPER LIMB: ICD-10-CM

## 2023-09-12 DIAGNOSIS — Z87.891 PERSONAL HISTORY OF NICOTINE DEPENDENCE: ICD-10-CM

## 2023-09-15 DIAGNOSIS — I10 ESSENTIAL HYPERTENSION, BENIGN: ICD-10-CM

## 2023-09-18 RX ORDER — LOSARTAN POTASSIUM 50 MG/1
50 TABLET ORAL 2 TIMES DAILY
Qty: 180 TABLET | Refills: 3 | Status: SHIPPED | OUTPATIENT
Start: 2023-09-18 | End: 2024-05-29 | Stop reason: SDUPTHER

## 2023-09-19 DIAGNOSIS — Z91.030 BEE STING ALLERGY: ICD-10-CM

## 2023-09-29 VITALS — WEIGHT: 116.84 LBS | HEIGHT: 61 IN | BODY MASS INDEX: 22.06 KG/M2

## 2023-09-30 NOTE — PROGRESS NOTES
Service: Orthopaedics     Subjective Data:   JOSE DE JESUS FINNEY is a 78 year old Female who is Hospital Day # 1.    Objective Data:     Objective Information:    ORTHOPEDIC OPERATIVE NOTE    Name: Jose De Jesus Finney  : 45  Surgeon: Ishaan Boss DO  Facility: [facility]  Date of Surgery: 23     SURGEON:     Ishaan Boss DO  PRE OP DIAGNOSIS:  Carpal Tunnel Syndrome right Wrist  POST OP DIAGNOSIS:  Same  PROCEDURE:   Endoscopic Carpal Tunnel Release right Wrist    ANESTHESIA:  Local with sedation  ASSISTANT:    SA Mcfadden  COMPLICATIONS:   None.  CONDITION:    Satisfactory to PACU.  BLOOD LOSS: Minimal    INDICATION FOR PROCEDURE: The patient is a 78-year-old female who has failed nonsurgical management for right carpal tunnel syndrome. The patient was seen in the office, fully informed risks and benefits of the procedure, and elected to undergo the procedure.    PROCEDURE IN DETAIL: The patient was seen and consented preoperatively with side and site of surgery appropriately marked. The patient was taken back to the operative suite, placed supine on the operative table, and placed on monitor for the duration  of case. The patient was administered sedation and monitored throughout the entire surgery by Department of Anesthesia. While sedated, the patient was administered 5 cc of mixed marcaine and lidocaine to the volar aspect of wrist and palm for local anesthesia.  The operative upper extremity was then sterilely prepped and draped in sterile orthopedic fashion, elevated with an Esmarch, and tourniquet inflated to 250 mmHg for the duration of case, and time-out was performed confirming site of surgery and surgery  to be performed.    A 1-cm transverse incision was made to the ulnar aspect of the palmaris longus at proximal wrist crease. Skin and underlying tissues were sharply dissected down. Hemostasis maintained with bipolar electrocauterization. Distally based flap of the fascia   overlying the median nerve was then released, and under direct visualization, proximal fascia was released with Littler scissors. The elevator and dilator were then placed in the carpal tunnel with appropriate ease of passage and corrugated feel of the  undersurface of transcarpal ligament. The endoscope was then placed under direct visualization. The transverse carpal ligament was released in its entirety, confirmed both visually as well as by utilizing endoscope as a probe, which freely passed following  release. The nerve was evaluated. No evidence of lesion or adhesion was present.    The wound was irrigated with sterile saline, closed with 5-0 nylon suture. Sterile dressing was then placed of Xeroform and 4x4s affixed with Kerlix and Ace wrap. Tourniquet was deflated, and the patient was taken to PACU in satisfactory condition.    Electronically signed  Ishaan Boss DO     Assessment and Plan:   Code Status:  ·  Code Status Full Code     Advance Care Planning:  Advance Care Planning: Patient didn't wish to or  was unable to provide advance care plan       Electronic Signatures:  KEERTHI Boss James ()  (Signed 12-Sep-2023 09:14)   Authored: Service, Subjective Data, Objective Data, Assessment  and Plan, Note Completion      Last Updated: 12-Sep-2023 09:14 by KEERTHI Boss James ()

## 2023-09-30 NOTE — H&P
History & Physical Reviewed:   I have reviewed the History and Physical dated:  12-Sep-2023   History and Physical reviewed and relevant findings noted. Patient examined to review pertinent physical  findings.: No significant changes   Home Medications Reviewed: no changes noted   Allergies Reviewed: no changes noted       ERAS (Enhanced Recovery After Surgery):  ·  ERAS Patient: no     Consent:   COVID-19 Consent:  ·  COVID-19 Risk Consent Surgeon has reviewed key risks related to the risk of alexia COVID-19 and if they contract COVID-19 what the risks are.       Electronic Signatures:  KEERTHI Boss James ()  (Signed 12-Sep-2023 08:23)   Authored: History & Physical Reviewed, ERAS, Consent,  Note Completion      Last Updated: 12-Sep-2023 08:23 by KEERTHI Boss James ()

## 2023-10-02 ENCOUNTER — OFFICE VISIT (OUTPATIENT)
Dept: ORTHOPEDIC SURGERY | Facility: CLINIC | Age: 78
End: 2023-10-02
Payer: MEDICARE

## 2023-10-02 VITALS — WEIGHT: 118 LBS | BODY MASS INDEX: 22.28 KG/M2 | HEIGHT: 61 IN

## 2023-10-02 DIAGNOSIS — G56.01 CARPAL TUNNEL SYNDROME OF RIGHT WRIST: Primary | ICD-10-CM

## 2023-10-02 PROCEDURE — 1160F RVW MEDS BY RX/DR IN RCRD: CPT | Performed by: ORTHOPAEDIC SURGERY

## 2023-10-02 PROCEDURE — 1159F MED LIST DOCD IN RCRD: CPT | Performed by: ORTHOPAEDIC SURGERY

## 2023-10-02 PROCEDURE — 1036F TOBACCO NON-USER: CPT | Performed by: ORTHOPAEDIC SURGERY

## 2023-10-02 PROCEDURE — 99024 POSTOP FOLLOW-UP VISIT: CPT | Performed by: ORTHOPAEDIC SURGERY

## 2023-10-02 NOTE — PROGRESS NOTES
Post op right Endoscopic carpal  tunnel release done on 9/12/2023  Suture removal done today with no concerns.

## 2023-10-02 NOTE — LETTER
October 2, 2023     Surekha Maria MD  [address] [facility] [address]    Patient: Deborah Boston   YOB: 1945   Date of Visit: 10/2/2023       Dear Dr. Surekha Maria MD:    Thank you for referring Deborah Boston to me for evaluation. Below are my notes for this consultation.  If you have questions, please do not hesitate to call me. I look forward to following your patient along with you.       Sincerely,     Edmundo Boss, DO      CC: No Recipients  ______________________________________________________________________________________    78 y.o. female presents today for for follow up after right ECTR. The patient reports doing well, no issues. The DOS was 9/12, 3 weeks ago. Pain is controlled.  Night pain gone.  Tips with some numbness still.    Review of Systems    Constitutional: no fever, no chills, not feeling tired, no recent weight gain and no recent weight loss.   ENT: no nosebleeds.   Cardiovascular: no chest pain.   Respiratory: no shortness of breath and no cough.   Gastrointestinal: no abdominal pain, no nausea, no vomiting and no diarrhea.   Musculoskeletal: per HPI  Integumentary: no rashes and no skin wound.   Neurological: no headache.   Psychiatric: no depression and no sleep disturbances.   Endocrine: no muscle weakness and no muscle cramps.   Hematologic/Lymphatic: no swollen glands and no tendency for easy bruising.   All other systems have been reviewed and are negative for complaint    Post-Op Exam  Inspection:  no evidence of infection, no erythema, Palpation:  compartments are soft, Range of Motion:  full Stability:  stable Strength:  intact 5/5 Skin:  incision site clean, dry and intact, healing without complication, Vascular:  capillary refill <2 seconds distally, Sensation:  intact to light touch distally.    Constitutional   General appearance: Alert and in no acute distress. Well developed, well nourished.    Eyes   External Eye, Conjunctiva and lids: Normal  external exam - pupils were equal in size, round, reactive to light (PERRL) with normal accommodation and extraocular movements intact (EOMI).   Ears, Nose, Mouth, and Throat   Hearing: Normal.   Neck   Neck: No neck mass was observed. Supple.   Pulmonary   Respiratory effort: No respiratory distress.   Cardiovascular   Examination of extremities: No peripheral edema.   Abdomen   Abdomen: Soft nontender; no abdominal mass palpated.. No rebound, rigidity or guarding.    Skin   Skin and subcutaneous tissue: Normal skin color and pigmentation, normal skin turgor, and no rash.   Neurologic   Sensation: Normal.   Psychiatric   Judgment and insight: Intact.   Orientation to person, place, and time: Alert and oriented x 3.       Mood and affect: Normal.      3 weeks s/p right ECTR  I discussed the diagnosis and treatment options with the patient today along with their associated risks and benefits. After thorough discussion, the patient has elected to proceed with conservative management. All questions were answered to the patients satisfaction who seems satisfied with the plan.      Sutures removed  Steris  FU 4-6 weeks prn - no XR

## 2023-10-02 NOTE — PROGRESS NOTES
78 y.o. female presents today for for follow up after right ECTR. The patient reports doing well, no issues. The DOS was 9/12, 3 weeks ago. Pain is controlled.  Night pain gone.  Tips with some numbness still.    Review of Systems    Constitutional: no fever, no chills, not feeling tired, no recent weight gain and no recent weight loss.   ENT: no nosebleeds.   Cardiovascular: no chest pain.   Respiratory: no shortness of breath and no cough.   Gastrointestinal: no abdominal pain, no nausea, no vomiting and no diarrhea.   Musculoskeletal: per HPI  Integumentary: no rashes and no skin wound.   Neurological: no headache.   Psychiatric: no depression and no sleep disturbances.   Endocrine: no muscle weakness and no muscle cramps.   Hematologic/Lymphatic: no swollen glands and no tendency for easy bruising.   All other systems have been reviewed and are negative for complaint    Post-Op Exam  Inspection:  no evidence of infection, no erythema, Palpation:  compartments are soft, Range of Motion:  full Stability:  stable Strength:  intact 5/5 Skin:  incision site clean, dry and intact, healing without complication, Vascular:  capillary refill <2 seconds distally, Sensation:  intact to light touch distally.    Constitutional   General appearance: Alert and in no acute distress. Well developed, well nourished.    Eyes   External Eye, Conjunctiva and lids: Normal external exam - pupils were equal in size, round, reactive to light (PERRL) with normal accommodation and extraocular movements intact (EOMI).   Ears, Nose, Mouth, and Throat   Hearing: Normal.   Neck   Neck: No neck mass was observed. Supple.   Pulmonary   Respiratory effort: No respiratory distress.   Cardiovascular   Examination of extremities: No peripheral edema.   Abdomen   Abdomen: Soft nontender; no abdominal mass palpated.. No rebound, rigidity or guarding.    Skin   Skin and subcutaneous tissue: Normal skin color and pigmentation, normal skin turgor, and no  rash.   Neurologic   Sensation: Normal.   Psychiatric   Judgment and insight: Intact.   Orientation to person, place, and time: Alert and oriented x 3.       Mood and affect: Normal.      3 weeks s/p right ECTR  I discussed the diagnosis and treatment options with the patient today along with their associated risks and benefits. After thorough discussion, the patient has elected to proceed with conservative management. All questions were answered to the patients satisfaction who seems satisfied with the plan.      Sutures removed  Steris  FU 4-6 weeks prn - no XR

## 2023-10-03 RX ORDER — EPINEPHRINE 0.3 MG/.3ML
INJECTION SUBCUTANEOUS
Qty: 2 EACH | Refills: 0 | OUTPATIENT
Start: 2023-10-03

## 2023-11-07 DIAGNOSIS — I10 ESSENTIAL HYPERTENSION, BENIGN: ICD-10-CM

## 2023-11-08 RX ORDER — METOPROLOL SUCCINATE 50 MG/1
50 TABLET, EXTENDED RELEASE ORAL DAILY
Qty: 90 TABLET | Refills: 1 | Status: SHIPPED | OUTPATIENT
Start: 2023-11-08 | End: 2024-03-28

## 2023-11-16 ENCOUNTER — OFFICE VISIT (OUTPATIENT)
Dept: PRIMARY CARE | Facility: CLINIC | Age: 78
End: 2023-11-16
Payer: MEDICARE

## 2023-11-16 VITALS
HEIGHT: 61 IN | BODY MASS INDEX: 22.84 KG/M2 | WEIGHT: 121 LBS | SYSTOLIC BLOOD PRESSURE: 116 MMHG | HEART RATE: 75 BPM | OXYGEN SATURATION: 96 % | DIASTOLIC BLOOD PRESSURE: 71 MMHG

## 2023-11-16 DIAGNOSIS — Z23 ENCOUNTER FOR IMMUNIZATION: ICD-10-CM

## 2023-11-16 DIAGNOSIS — I73.9 PERIPHERAL VASCULAR DISEASE WITH CLAUDICATION (CMS-HCC): Primary | ICD-10-CM

## 2023-11-16 DIAGNOSIS — E78.2 MIXED HYPERLIPIDEMIA: ICD-10-CM

## 2023-11-16 DIAGNOSIS — R73.03 PREDIABETES: ICD-10-CM

## 2023-11-16 DIAGNOSIS — E55.9 VITAMIN D DEFICIENCY: ICD-10-CM

## 2023-11-16 DIAGNOSIS — J30.9 ALLERGIC RHINITIS, UNSPECIFIED SEASONALITY, UNSPECIFIED TRIGGER: ICD-10-CM

## 2023-11-16 DIAGNOSIS — N32.81 OVERACTIVE BLADDER: ICD-10-CM

## 2023-11-16 DIAGNOSIS — B00.1 RECURRENT COLD SORES: ICD-10-CM

## 2023-11-16 DIAGNOSIS — E78.1 ESSENTIAL HYPERTRIGLYCERIDEMIA: ICD-10-CM

## 2023-11-16 DIAGNOSIS — K58.2 IRRITABLE BOWEL SYNDROME WITH BOTH CONSTIPATION AND DIARRHEA: ICD-10-CM

## 2023-11-16 DIAGNOSIS — Z59.41 FOOD INSECURITY: ICD-10-CM

## 2023-11-16 DIAGNOSIS — Z11.4 ENCOUNTER FOR SCREENING FOR HIV: ICD-10-CM

## 2023-11-16 DIAGNOSIS — K21.9 GASTROESOPHAGEAL REFLUX DISEASE WITHOUT ESOPHAGITIS: ICD-10-CM

## 2023-11-16 DIAGNOSIS — M53.9 MULTILEVEL DEGENERATIVE DISC DISEASE: ICD-10-CM

## 2023-11-16 DIAGNOSIS — M19.90 ARTHRITIS: ICD-10-CM

## 2023-11-16 DIAGNOSIS — Z11.59 NEED FOR HEPATITIS C SCREENING TEST: ICD-10-CM

## 2023-11-16 DIAGNOSIS — I10 ESSENTIAL HYPERTENSION: ICD-10-CM

## 2023-11-16 DIAGNOSIS — F41.9 ANXIETY: ICD-10-CM

## 2023-11-16 DIAGNOSIS — L85.3 DRY SKIN DERMATITIS: ICD-10-CM

## 2023-11-16 PROBLEM — M79.643 PAIN OF HAND: Status: ACTIVE | Noted: 2023-11-16

## 2023-11-16 PROBLEM — R73.9 HYPERGLYCEMIA: Status: RESOLVED | Noted: 2023-06-26 | Resolved: 2023-11-16

## 2023-11-16 PROBLEM — M25.569 KNEE PAIN: Status: ACTIVE | Noted: 2023-11-16

## 2023-11-16 PROBLEM — K59.00 CONSTIPATION: Status: ACTIVE | Noted: 2023-03-05

## 2023-11-16 PROBLEM — K59.04 CHRONIC IDIOPATHIC CONSTIPATION: Status: ACTIVE | Noted: 2023-11-16

## 2023-11-16 PROBLEM — R31.9 BLOOD IN URINE: Status: ACTIVE | Noted: 2023-11-16

## 2023-11-16 PROBLEM — T78.40XA ALLERGIC REACTION: Status: ACTIVE | Noted: 2023-11-16

## 2023-11-16 PROCEDURE — 1036F TOBACCO NON-USER: CPT | Performed by: FAMILY MEDICINE

## 2023-11-16 PROCEDURE — 99214 OFFICE O/P EST MOD 30 MIN: CPT | Performed by: FAMILY MEDICINE

## 2023-11-16 PROCEDURE — G0008 ADMIN INFLUENZA VIRUS VAC: HCPCS | Performed by: FAMILY MEDICINE

## 2023-11-16 PROCEDURE — 1159F MED LIST DOCD IN RCRD: CPT | Performed by: FAMILY MEDICINE

## 2023-11-16 PROCEDURE — 90662 IIV NO PRSV INCREASED AG IM: CPT | Performed by: FAMILY MEDICINE

## 2023-11-16 PROCEDURE — 1160F RVW MEDS BY RX/DR IN RCRD: CPT | Performed by: FAMILY MEDICINE

## 2023-11-16 PROCEDURE — 3074F SYST BP LT 130 MM HG: CPT | Performed by: FAMILY MEDICINE

## 2023-11-16 PROCEDURE — 3078F DIAST BP <80 MM HG: CPT | Performed by: FAMILY MEDICINE

## 2023-11-16 RX ORDER — VALACYCLOVIR HYDROCHLORIDE 1 G/1
2000 TABLET, FILM COATED ORAL EVERY 12 HOURS
Qty: 4 TABLET | Refills: 0
Start: 2023-11-16

## 2023-11-16 RX ORDER — POLYETHYLENE GLYCOL 3350 17 G/17G
17 POWDER, FOR SOLUTION ORAL DAILY
Start: 2023-11-16 | End: 2024-02-21 | Stop reason: SDUPTHER

## 2023-11-16 SDOH — ECONOMIC STABILITY: FOOD INSECURITY: WITHIN THE PAST 12 MONTHS, YOU WORRIED THAT YOUR FOOD WOULD RUN OUT BEFORE YOU GOT MONEY TO BUY MORE.: SOMETIMES TRUE

## 2023-11-16 SDOH — ECONOMIC STABILITY: FOOD INSECURITY: WITHIN THE PAST 12 MONTHS, THE FOOD YOU BOUGHT JUST DIDN'T LAST AND YOU DIDN'T HAVE MONEY TO GET MORE.: SOMETIMES TRUE

## 2023-11-16 SDOH — ECONOMIC STABILITY - FOOD INSECURITY: FOOD INSECURITY: Z59.41

## 2023-11-16 ASSESSMENT — ENCOUNTER SYMPTOMS
HEADACHES: 0
NUMBNESS: 0
CHEST TIGHTNESS: 0
CONSTIPATION: 0
POLYPHAGIA: 0
SHORTNESS OF BREATH: 0
CONFUSION: 0
WHEEZING: 0
NAUSEA: 0
ABDOMINAL PAIN: 0
POLYDIPSIA: 0
SORE THROAT: 0
FEVER: 0
HEMATURIA: 0
COUGH: 0
PALPITATIONS: 0
LIGHT-HEADEDNESS: 0
ADENOPATHY: 0
SINUS PRESSURE: 0
UNEXPECTED WEIGHT CHANGE: 0
DYSPHORIC MOOD: 0
NERVOUS/ANXIOUS: 0
CHILLS: 0
DIARRHEA: 0
SINUS PAIN: 0
DIAPHORESIS: 0
VOMITING: 0
DIZZINESS: 0
FREQUENCY: 0
DYSURIA: 0

## 2023-11-16 NOTE — PROGRESS NOTES
"Subjective   Patient ID: Deborah Boston is a 78 y.o. female who presents for Follow-up.    [facility]     routine follow up. chronic issues as per assessment and plan.     Review of Systems   Constitutional:  Negative for chills, diaphoresis, fever and unexpected weight change.   HENT:  Negative for congestion, sinus pressure, sinus pain, sneezing and sore throat.    Respiratory:  Negative for cough, chest tightness, shortness of breath and wheezing.    Cardiovascular:  Negative for chest pain, palpitations and leg swelling.   Gastrointestinal:  Negative for abdominal pain, constipation, diarrhea, nausea and vomiting.   Endocrine: Negative for cold intolerance, heat intolerance, polydipsia, polyphagia and polyuria.   Genitourinary:  Negative for dysuria, frequency, hematuria and urgency.   Neurological:  Negative for dizziness, syncope, light-headedness, numbness and headaches.   Hematological:  Negative for adenopathy.   Psychiatric/Behavioral:  Negative for confusion and dysphoric mood. The patient is not nervous/anxious.        Objective   /71   Pulse 75   Ht 1.549 m (5' 1\")   Wt 54.9 kg (121 lb)   SpO2 96%   BMI 22.86 kg/m²     Physical Exam  Vitals and nursing note reviewed.   Constitutional:       General: She is not in acute distress.     Appearance: Normal appearance.   HENT:      Head: Normocephalic and atraumatic.      Nose: Nose normal.   Eyes:      Extraocular Movements: Extraocular movements intact.      Conjunctiva/sclera: Conjunctivae normal.      Pupils: Pupils are equal, round, and reactive to light.   Cardiovascular:      Rate and Rhythm: Normal rate and regular rhythm.      Heart sounds: No murmur heard.     No friction rub. No gallop.   Pulmonary:      Effort: Pulmonary effort is normal.      Breath sounds: Normal breath sounds. No wheezing, rhonchi or rales.   Abdominal:      General: Bowel sounds are normal. There is no distension.      Palpations: Abdomen is soft.      Tenderness: " There is no abdominal tenderness.   Musculoskeletal:         General: Normal range of motion.      Cervical back: Normal range of motion and neck supple.   Skin:     General: Skin is warm and dry.   Neurological:      General: No focal deficit present.      Mental Status: She is alert and oriented to person, place, and time.      Deep Tendon Reflexes: Reflexes normal.   Psychiatric:         Mood and Affect: Mood normal.         Behavior: Behavior normal.         Thought Content: Thought content normal.         Judgment: Judgment normal.         Assessment/Plan   Problem List Items Addressed This Visit             ICD-10-CM    Allergic rhinitis J30.9     - controlled. continue Flonase and loratadine          Relevant Orders    CBC and Auto Differential    Comprehensive Metabolic Panel    TSH with reflex to Free T4 if abnormal    Anxiety F41.9     --feels it is manageable at this point without medication. Will continue to monitor         Relevant Orders    CBC and Auto Differential    Comprehensive Metabolic Panel    TSH with reflex to Free T4 if abnormal    Arthritis M19.90     -continue acetaminophen as needed.         Relevant Orders    CBC and Auto Differential    Comprehensive Metabolic Panel    TSH with reflex to Free T4 if abnormal    Dry skin dermatitis L85.3    Relevant Orders    CBC and Auto Differential    Comprehensive Metabolic Panel    TSH with reflex to Free T4 if abnormal    Encounter for immunization Z23     - high dose flu shot administered   - recommended the following vaccines at the pharmacy --> COVID-19, RSV         Relevant Orders    Flu vaccine, quadrivalent, high-dose, preservative free, age 65y+ (FLUZONE) (Completed)    Essential hypertension I10     - current regimen: on chlorthalidone 25 mg daily, losartan 50 mg daily, metoprolol XL 50 mg daily           Relevant Orders    CBC and Auto Differential    Comprehensive Metabolic Panel    TSH with reflex to Free T4 if abnormal    Essential  hypertriglyceridemia E78.1     -continue atorvastatin  - check labs          Relevant Orders    CBC and Auto Differential    Comprehensive Metabolic Panel    TSH with reflex to Free T4 if abnormal    Food insecurity Z59.41     - refer to [facility]         Relevant Orders    Referral to [facility]    Gastroesophageal reflux disease without esophagitis K21.9     -stable, continue famotidine         Relevant Orders    CBC and Auto Differential    Comprehensive Metabolic Panel    TSH with reflex to Free T4 if abnormal    Irritable bowel syndrome with both constipation and diarrhea K58.2     -continue miralax         Relevant Medications    polyethylene glycol (Glycolax, Miralax) 17 gram/dose powder    Mixed hyperlipidemia E78.2     -continue atorvastatin  - check labs          Relevant Orders    CBC and Auto Differential    Comprehensive Metabolic Panel    Lipid Panel    TSH with reflex to Free T4 if abnormal    Multilevel degenerative disc disease M53.9     -continue acetaminophen as needed         Relevant Orders    CBC and Auto Differential    Comprehensive Metabolic Panel    TSH with reflex to Free T4 if abnormal    Overactive bladder N32.81     - controlled. Continue oxybutynin          Peripheral vascular disease with claudication (CMS/HCC) - Primary I73.9     -continue daily aspirin, statin  - ABIs lower extremities 2/2023 were normal          Relevant Orders    CBC and Auto Differential    Comprehensive Metabolic Panel    TSH with reflex to Free T4 if abnormal    Prediabetes R73.03     - Encouraged healthy lifestyle, including adequate exercise and high fiber, low fat and low carb diet.          Relevant Orders    Hemoglobin A1C    CBC and Auto Differential    Comprehensive Metabolic Panel    TSH with reflex to Free T4 if abnormal    Recurrent cold sores B00.1    Relevant Medications    valACYclovir (Valtrex) 1 gram tablet    Other Relevant Orders    CBC and Auto Differential    Comprehensive Metabolic  Panel    TSH with reflex to Free T4 if abnormal    Vitamin D deficiency E55.9    Relevant Orders    Vitamin D, Total    CBC and Auto Differential    Comprehensive Metabolic Panel    TSH with reflex to Free T4 if abnormal     Other Visit Diagnoses         Codes    Encounter for screening for HIV     Z11.4    Relevant Orders    HIV 1/2 Antigen/Antibody Screen with Reflex to Confirmation    Need for hepatitis C screening test     Z11.59    Relevant Orders    Hepatitis C Antibody

## 2023-11-16 NOTE — ASSESSMENT & PLAN NOTE
- current regimen: on chlorthalidone 25 mg daily, losartan 50 mg daily, metoprolol XL 50 mg daily

## 2023-11-16 NOTE — PATIENT INSTRUCTIONS
Deborah Boston ,    Thank you for coming in today. We at [facility] appreciate your trust in our care. If you have any questions or concerns about the care you received today, please do not hesitate to contact us at [phone].    The following instructions were discussed today:    - get labs. For blood work: Nothing to eat or drink for at least 10 hours prior. Okay for water or black coffee.   - I recommend the following vaccines at the pharmacy --> COVID-19, RSV  -Follow up in 6 months

## 2023-11-16 NOTE — ASSESSMENT & PLAN NOTE
- high dose flu shot administered   - recommended the following vaccines at the pharmacy --> COVID-19, RSV

## 2023-11-21 ENCOUNTER — LAB (OUTPATIENT)
Dept: LAB | Facility: LAB | Age: 78
End: 2023-11-21
Payer: MEDICARE

## 2023-11-21 ENCOUNTER — TELEPHONE (OUTPATIENT)
Dept: PRIMARY CARE | Facility: CLINIC | Age: 78
End: 2023-11-21

## 2023-11-21 DIAGNOSIS — M53.9 MULTILEVEL DEGENERATIVE DISC DISEASE: ICD-10-CM

## 2023-11-21 DIAGNOSIS — E55.9 VITAMIN D DEFICIENCY: ICD-10-CM

## 2023-11-21 DIAGNOSIS — E78.2 MIXED HYPERLIPIDEMIA: ICD-10-CM

## 2023-11-21 DIAGNOSIS — L85.3 DRY SKIN DERMATITIS: ICD-10-CM

## 2023-11-21 DIAGNOSIS — F41.9 ANXIETY: ICD-10-CM

## 2023-11-21 DIAGNOSIS — I10 ESSENTIAL HYPERTENSION: ICD-10-CM

## 2023-11-21 DIAGNOSIS — T78.40XA ALLERGIC REACTION, INITIAL ENCOUNTER: Primary | ICD-10-CM

## 2023-11-21 DIAGNOSIS — Z11.59 NEED FOR HEPATITIS C SCREENING TEST: ICD-10-CM

## 2023-11-21 DIAGNOSIS — E78.1 ESSENTIAL HYPERTRIGLYCERIDEMIA: ICD-10-CM

## 2023-11-21 DIAGNOSIS — K21.9 GASTROESOPHAGEAL REFLUX DISEASE WITHOUT ESOPHAGITIS: ICD-10-CM

## 2023-11-21 DIAGNOSIS — R73.03 PREDIABETES: ICD-10-CM

## 2023-11-21 DIAGNOSIS — B00.1 RECURRENT COLD SORES: ICD-10-CM

## 2023-11-21 DIAGNOSIS — Z11.4 ENCOUNTER FOR SCREENING FOR HIV: ICD-10-CM

## 2023-11-21 DIAGNOSIS — I73.9 PERIPHERAL VASCULAR DISEASE WITH CLAUDICATION (CMS-HCC): ICD-10-CM

## 2023-11-21 DIAGNOSIS — M19.90 ARTHRITIS: ICD-10-CM

## 2023-11-21 DIAGNOSIS — J30.9 ALLERGIC RHINITIS, UNSPECIFIED SEASONALITY, UNSPECIFIED TRIGGER: ICD-10-CM

## 2023-11-21 LAB
25(OH)D3 SERPL-MCNC: 33 NG/ML (ref 30–100)
ALBUMIN SERPL BCP-MCNC: 4.5 G/DL (ref 3.4–5)
ALP SERPL-CCNC: 54 U/L (ref 33–136)
ALT SERPL W P-5'-P-CCNC: 14 U/L (ref 7–45)
ANION GAP SERPL CALC-SCNC: 12 MMOL/L (ref 10–20)
AST SERPL W P-5'-P-CCNC: 16 U/L (ref 9–39)
BASOPHILS # BLD AUTO: 0.04 X10*3/UL (ref 0–0.1)
BASOPHILS NFR BLD AUTO: 0.8 %
BILIRUB SERPL-MCNC: 0.9 MG/DL (ref 0–1.2)
BUN SERPL-MCNC: 18 MG/DL (ref 6–23)
CALCIUM SERPL-MCNC: 9.2 MG/DL (ref 8.6–10.3)
CHLORIDE SERPL-SCNC: 102 MMOL/L (ref 98–107)
CHOLEST SERPL-MCNC: 139 MG/DL (ref 0–199)
CHOLESTEROL/HDL RATIO: 3
CO2 SERPL-SCNC: 31 MMOL/L (ref 21–32)
CREAT SERPL-MCNC: 0.58 MG/DL (ref 0.5–1.05)
EOSINOPHIL # BLD AUTO: 0.26 X10*3/UL (ref 0–0.4)
EOSINOPHIL NFR BLD AUTO: 5.5 %
ERYTHROCYTE [DISTWIDTH] IN BLOOD BY AUTOMATED COUNT: 11.9 % (ref 11.5–14.5)
EST. AVERAGE GLUCOSE BLD GHB EST-MCNC: 117 MG/DL
GFR SERPL CREATININE-BSD FRML MDRD: >90 ML/MIN/1.73M*2
GLUCOSE SERPL-MCNC: 96 MG/DL (ref 74–99)
HBA1C MFR BLD: 5.7 %
HCT VFR BLD AUTO: 41.7 % (ref 36–46)
HCV AB SER QL: NONREACTIVE
HDLC SERPL-MCNC: 46.2 MG/DL
HGB BLD-MCNC: 14 G/DL (ref 12–16)
HIV 1+2 AB+HIV1 P24 AG SERPL QL IA: NONREACTIVE
IMM GRANULOCYTES # BLD AUTO: 0.01 X10*3/UL (ref 0–0.5)
IMM GRANULOCYTES NFR BLD AUTO: 0.2 % (ref 0–0.9)
LDLC SERPL CALC-MCNC: 67 MG/DL
LYMPHOCYTES # BLD AUTO: 1.46 X10*3/UL (ref 0.8–3)
LYMPHOCYTES NFR BLD AUTO: 30.6 %
MCH RBC QN AUTO: 30.9 PG (ref 26–34)
MCHC RBC AUTO-ENTMCNC: 33.6 G/DL (ref 32–36)
MCV RBC AUTO: 92 FL (ref 80–100)
MONOCYTES # BLD AUTO: 0.47 X10*3/UL (ref 0.05–0.8)
MONOCYTES NFR BLD AUTO: 9.9 %
NEUTROPHILS # BLD AUTO: 2.53 X10*3/UL (ref 1.6–5.5)
NEUTROPHILS NFR BLD AUTO: 53 %
NON HDL CHOLESTEROL: 93 MG/DL (ref 0–149)
NRBC BLD-RTO: 0 /100 WBCS (ref 0–0)
PLATELET # BLD AUTO: 221 X10*3/UL (ref 150–450)
POTASSIUM SERPL-SCNC: 3.6 MMOL/L (ref 3.5–5.3)
PROT SERPL-MCNC: 6.5 G/DL (ref 6.4–8.2)
RBC # BLD AUTO: 4.53 X10*6/UL (ref 4–5.2)
SODIUM SERPL-SCNC: 141 MMOL/L (ref 136–145)
TRIGL SERPL-MCNC: 128 MG/DL (ref 0–149)
TSH SERPL-ACNC: 0.63 MIU/L (ref 0.44–3.98)
VLDL: 26 MG/DL (ref 0–40)
WBC # BLD AUTO: 4.8 X10*3/UL (ref 4.4–11.3)

## 2023-11-21 PROCEDURE — 80061 LIPID PANEL: CPT

## 2023-11-21 PROCEDURE — 80053 COMPREHEN METABOLIC PANEL: CPT

## 2023-11-21 PROCEDURE — 36415 COLL VENOUS BLD VENIPUNCTURE: CPT

## 2023-11-21 PROCEDURE — 85025 COMPLETE CBC W/AUTO DIFF WBC: CPT

## 2023-11-21 PROCEDURE — 86803 HEPATITIS C AB TEST: CPT

## 2023-11-21 PROCEDURE — 82306 VITAMIN D 25 HYDROXY: CPT

## 2023-11-21 PROCEDURE — 84443 ASSAY THYROID STIM HORMONE: CPT

## 2023-11-21 PROCEDURE — 87389 HIV-1 AG W/HIV-1&-2 AB AG IA: CPT

## 2023-11-21 PROCEDURE — 83036 HEMOGLOBIN GLYCOSYLATED A1C: CPT

## 2023-11-21 RX ORDER — MONTELUKAST SODIUM 10 MG/1
10 TABLET ORAL NIGHTLY
Qty: 30 TABLET | Refills: 5 | Status: SHIPPED | OUTPATIENT
Start: 2023-11-21 | End: 2024-02-21 | Stop reason: WASHOUT

## 2023-11-21 RX ORDER — METHYLPREDNISOLONE 4 MG/1
TABLET ORAL
Qty: 21 TABLET | Refills: 0 | Status: SHIPPED | OUTPATIENT
Start: 2023-11-21 | End: 2023-11-28

## 2023-11-21 NOTE — ASSESSMENT & PLAN NOTE
- continue loratadine  - start singulair  - start medrol dose pack  - Call if symptoms worsen or persist.

## 2023-11-21 NOTE — TELEPHONE ENCOUNTER
Patient is concerned. She is having swelling in her eyes. States she went to [employer] and had a donut. After that, noticed swelling and redness around her eyes. She states they are very itchy. Similar thing happened in September. She was given loratadine and steroids. This did help.

## 2024-02-21 ENCOUNTER — OFFICE VISIT (OUTPATIENT)
Dept: PRIMARY CARE | Facility: CLINIC | Age: 79
End: 2024-02-21
Payer: MEDICARE

## 2024-02-21 VITALS
DIASTOLIC BLOOD PRESSURE: 82 MMHG | SYSTOLIC BLOOD PRESSURE: 144 MMHG | HEART RATE: 79 BPM | BODY MASS INDEX: 22.66 KG/M2 | TEMPERATURE: 98.4 F | OXYGEN SATURATION: 97 % | WEIGHT: 120 LBS | HEIGHT: 61 IN

## 2024-02-21 DIAGNOSIS — Z00.00 MEDICARE ANNUAL WELLNESS VISIT, SUBSEQUENT: Primary | ICD-10-CM

## 2024-02-21 DIAGNOSIS — J30.9 ALLERGIC RHINITIS, UNSPECIFIED SEASONALITY, UNSPECIFIED TRIGGER: ICD-10-CM

## 2024-02-21 DIAGNOSIS — L85.3 DRY SKIN DERMATITIS: ICD-10-CM

## 2024-02-21 DIAGNOSIS — E78.1 ESSENTIAL HYPERTRIGLYCERIDEMIA: ICD-10-CM

## 2024-02-21 DIAGNOSIS — K58.2 IRRITABLE BOWEL SYNDROME WITH BOTH CONSTIPATION AND DIARRHEA: ICD-10-CM

## 2024-02-21 DIAGNOSIS — I73.9 PERIPHERAL VASCULAR DISEASE WITH CLAUDICATION (CMS-HCC): ICD-10-CM

## 2024-02-21 DIAGNOSIS — R73.03 PREDIABETES: ICD-10-CM

## 2024-02-21 DIAGNOSIS — M79.604 BILATERAL LEG PAIN: ICD-10-CM

## 2024-02-21 DIAGNOSIS — M79.605 BILATERAL LEG PAIN: ICD-10-CM

## 2024-02-21 DIAGNOSIS — F41.9 ANXIETY: ICD-10-CM

## 2024-02-21 DIAGNOSIS — Z91.030 BEE STING ALLERGY: ICD-10-CM

## 2024-02-21 DIAGNOSIS — B00.1 RECURRENT COLD SORES: ICD-10-CM

## 2024-02-21 DIAGNOSIS — M19.90 ARTHRITIS: ICD-10-CM

## 2024-02-21 DIAGNOSIS — I10 ESSENTIAL HYPERTENSION: ICD-10-CM

## 2024-02-21 DIAGNOSIS — N32.81 OVERACTIVE BLADDER: ICD-10-CM

## 2024-02-21 PROBLEM — R80.9 MICROALBUMINURIA: Status: RESOLVED | Noted: 2023-03-05 | Resolved: 2024-02-21

## 2024-02-21 PROBLEM — R31.9 BLOOD IN URINE: Status: RESOLVED | Noted: 2023-11-16 | Resolved: 2024-02-21

## 2024-02-21 PROBLEM — T78.40XA ALLERGIC REACTION: Status: RESOLVED | Noted: 2023-11-16 | Resolved: 2024-02-21

## 2024-02-21 PROBLEM — M25.569 KNEE PAIN: Status: RESOLVED | Noted: 2023-11-16 | Resolved: 2024-02-21

## 2024-02-21 PROBLEM — M79.643 PAIN OF HAND: Status: RESOLVED | Noted: 2023-11-16 | Resolved: 2024-02-21

## 2024-02-21 PROBLEM — G56.03 CARPAL TUNNEL SYNDROME, BILATERAL UPPER LIMBS: Status: RESOLVED | Noted: 2023-03-05 | Resolved: 2024-02-21

## 2024-02-21 PROCEDURE — 3079F DIAST BP 80-89 MM HG: CPT | Performed by: FAMILY MEDICINE

## 2024-02-21 PROCEDURE — 99214 OFFICE O/P EST MOD 30 MIN: CPT | Performed by: FAMILY MEDICINE

## 2024-02-21 PROCEDURE — 3077F SYST BP >= 140 MM HG: CPT | Performed by: FAMILY MEDICINE

## 2024-02-21 PROCEDURE — 1123F ACP DISCUSS/DSCN MKR DOCD: CPT | Performed by: FAMILY MEDICINE

## 2024-02-21 PROCEDURE — 1158F ADVNC CARE PLAN TLK DOCD: CPT | Performed by: FAMILY MEDICINE

## 2024-02-21 PROCEDURE — 1036F TOBACCO NON-USER: CPT | Performed by: FAMILY MEDICINE

## 2024-02-21 PROCEDURE — G0439 PPPS, SUBSEQ VISIT: HCPCS | Performed by: FAMILY MEDICINE

## 2024-02-21 PROCEDURE — 1159F MED LIST DOCD IN RCRD: CPT | Performed by: FAMILY MEDICINE

## 2024-02-21 PROCEDURE — 1160F RVW MEDS BY RX/DR IN RCRD: CPT | Performed by: FAMILY MEDICINE

## 2024-02-21 PROCEDURE — 1157F ADVNC CARE PLAN IN RCRD: CPT | Performed by: FAMILY MEDICINE

## 2024-02-21 PROCEDURE — 1170F FXNL STATUS ASSESSED: CPT | Performed by: FAMILY MEDICINE

## 2024-02-21 RX ORDER — FLUOCINONIDE 0.5 MG/G
CREAM TOPICAL 2 TIMES DAILY PRN
Qty: 60 G | Refills: 2 | Status: SHIPPED | OUTPATIENT
Start: 2024-02-21 | End: 2025-02-20

## 2024-02-21 RX ORDER — OXYBUTYNIN CHLORIDE 5 MG/1
5 TABLET, EXTENDED RELEASE ORAL DAILY
Qty: 90 TABLET | Refills: 1
Start: 2024-02-21 | End: 2024-05-29 | Stop reason: SDUPTHER

## 2024-02-21 RX ORDER — POLYETHYLENE GLYCOL 3350 17 G/17G
17 POWDER, FOR SOLUTION ORAL DAILY
Qty: 510 G | Refills: 1 | Status: SHIPPED | OUTPATIENT
Start: 2024-02-21 | End: 2024-08-19

## 2024-02-21 RX ORDER — EPINEPHRINE 0.3 MG/.3ML
0.3 INJECTION SUBCUTANEOUS ONCE AS NEEDED
Qty: 2 EACH | Refills: 0 | Status: SHIPPED | OUTPATIENT
Start: 2024-02-21

## 2024-02-21 ASSESSMENT — ENCOUNTER SYMPTOMS
PALPITATIONS: 0
DYSPHORIC MOOD: 0
NUMBNESS: 0
NAUSEA: 0
SORE THROAT: 0
SINUS PRESSURE: 0
CONFUSION: 0
ADENOPATHY: 0
DIARRHEA: 0
VOMITING: 0
DIAPHORESIS: 0
CHEST TIGHTNESS: 0
DIZZINESS: 0
FEVER: 0
HEMATURIA: 0
UNEXPECTED WEIGHT CHANGE: 0
WHEEZING: 0
DYSURIA: 0
FREQUENCY: 0
CONSTIPATION: 0
LIGHT-HEADEDNESS: 0
SINUS PAIN: 0
POLYPHAGIA: 0
COUGH: 0
SHORTNESS OF BREATH: 0
HEADACHES: 0
CHILLS: 0
NERVOUS/ANXIOUS: 0
POLYDIPSIA: 0
ABDOMINAL PAIN: 0

## 2024-02-21 ASSESSMENT — ACTIVITIES OF DAILY LIVING (ADL)
GROCERY_SHOPPING: INDEPENDENT
DOING_HOUSEWORK: INDEPENDENT
BATHING: INDEPENDENT
MANAGING_FINANCES: INDEPENDENT
DRESSING: INDEPENDENT
TAKING_MEDICATION: INDEPENDENT

## 2024-02-21 ASSESSMENT — PATIENT HEALTH QUESTIONNAIRE - PHQ9
1. LITTLE INTEREST OR PLEASURE IN DOING THINGS: NOT AT ALL
SUM OF ALL RESPONSES TO PHQ9 QUESTIONS 1 AND 2: 0
2. FEELING DOWN, DEPRESSED OR HOPELESS: NOT AT ALL
2. FEELING DOWN, DEPRESSED OR HOPELESS: NOT AT ALL
1. LITTLE INTEREST OR PLEASURE IN DOING THINGS: NOT AT ALL
SUM OF ALL RESPONSES TO PHQ9 QUESTIONS 1 AND 2: 0

## 2024-02-21 NOTE — ASSESSMENT & PLAN NOTE
- current regimen: on chlorthalidone 25 mg daily, losartan 50 mg daily, metoprolol XL 50 mg daily  - blood pressure reasonably well controlled and she has had issues with lightheaded ness when doses increased. Will continue to monitor

## 2024-02-21 NOTE — PATIENT INSTRUCTIONS
Deborah Boston ,    Thank you for coming in today. We at [facility] appreciate your trust in our care. If you have any questions or concerns about the care you received today, please do not hesitate to contact us at [phone].    The following instructions were discussed today:    - check vascular studies of legs   - Follow up in 3 months.

## 2024-02-21 NOTE — PROGRESS NOTES
"Subjective   Reason for Visit: Deborah Boston is an 78 y.o. female here for a Medicare Wellness visit.     Past Medical, Surgical, and Family History reviewed and updated in chart.    Reviewed all medications by prescribing practitioner or clinical pharmacist (such as prescriptions, OTCs, herbal therapies and supplements) and documented in the medical record.    HPI  routine follow up. chronic issues as per assessment and plan.     Having pain in both legs. Hurt when she walks. Seems to be better if she wears more supportive shoes. She does not have the pain when she sits down. Denies numbness or tingling of feet. Denies any significant back pain.     Patient Care Team:  Surekha Maria MD as PCP - General  Surekha Maria MD as PCP - [employer] PCP     Review of Systems   Constitutional:  Negative for chills, diaphoresis, fever and unexpected weight change.   HENT:  Negative for congestion, sinus pressure, sinus pain, sneezing and sore throat.    Respiratory:  Negative for cough, chest tightness, shortness of breath and wheezing.    Cardiovascular:  Negative for chest pain, palpitations and leg swelling.   Gastrointestinal:  Negative for abdominal pain, constipation, diarrhea, nausea and vomiting.   Endocrine: Negative for cold intolerance, heat intolerance, polydipsia, polyphagia and polyuria.   Genitourinary:  Negative for dysuria, frequency, hematuria and urgency.   Neurological:  Negative for dizziness, syncope, light-headedness, numbness and headaches.   Hematological:  Negative for adenopathy.   Psychiatric/Behavioral:  Negative for confusion and dysphoric mood. The patient is not nervous/anxious.        Objective   Vitals:  /82 (BP Location: Left arm, Patient Position: Sitting, BP Cuff Size: Adult)   Pulse 79   Temp 36.9 °C (98.4 °F)   Ht 1.549 m (5' 1\")   Wt 54.4 kg (120 lb)   SpO2 97%   BMI 22.67 kg/m²       Physical Exam  Vitals and nursing note reviewed.   Constitutional:       " General: She is not in acute distress.     Appearance: Normal appearance.   HENT:      Head: Normocephalic and atraumatic.      Nose: Nose normal.   Eyes:      Extraocular Movements: Extraocular movements intact.      Conjunctiva/sclera: Conjunctivae normal.      Pupils: Pupils are equal, round, and reactive to light.   Cardiovascular:      Rate and Rhythm: Normal rate and regular rhythm.      Heart sounds: No murmur heard.     No friction rub. No gallop.   Pulmonary:      Effort: Pulmonary effort is normal.      Breath sounds: Normal breath sounds. No wheezing, rhonchi or rales.   Abdominal:      General: Bowel sounds are normal. There is no distension.      Palpations: Abdomen is soft.      Tenderness: There is no abdominal tenderness.   Musculoskeletal:         General: Normal range of motion.      Cervical back: Normal range of motion and neck supple.   Skin:     General: Skin is warm and dry.   Neurological:      General: No focal deficit present.      Mental Status: She is alert and oriented to person, place, and time.      Deep Tendon Reflexes: Reflexes normal.   Psychiatric:         Mood and Affect: Mood normal.         Behavior: Behavior normal.         Thought Content: Thought content normal.         Judgment: Judgment normal.         Assessment/Plan   Problem List Items Addressed This Visit       Allergic rhinitis    Current Assessment & Plan     - controlled. Continue flonase and loratadine          Anxiety    Current Assessment & Plan     -feels it is manageable at this point without medication. Will continue to monitor         Arthritis    Current Assessment & Plan     -continue acetaminophen as needed.         Bee sting allergy    Relevant Medications    EPINEPHrine 0.3 mg/0.3 mL injection syringe    Bilateral leg pain    Current Assessment & Plan     - symptoms seem consistent with claudication  - check ABIs         Relevant Orders    Vascular US Ankle Brachial Index (DWAYNE) With Exercise    Vascular  US Ankle Brachial Index (DWAYNE) Without Exercise    Dry skin dermatitis    Relevant Medications    fluocinonide (Lidex) 0.05 % cream    Essential hypertension    Current Assessment & Plan     - current regimen: on chlorthalidone 25 mg daily, losartan 50 mg daily, metoprolol XL 50 mg daily  - blood pressure reasonably well controlled and she has had issues with lightheaded ness when doses increased. Will continue to monitor           Essential hypertriglyceridemia    Current Assessment & Plan     -continue atorvastatin           Irritable bowel syndrome with both constipation and diarrhea    Current Assessment & Plan     -continue miralax         Relevant Medications    polyethylene glycol (Glycolax, Miralax) 17 gram/dose powder    Overactive bladder    Current Assessment & Plan     - controlled. Continue oxybutynin          Relevant Medications    oxybutynin XL (Ditropan XL) 5 mg 24 hr tablet    Peripheral vascular disease with claudication (CMS/HCC)    Current Assessment & Plan     - continue daily aspirin, statin  - having increase leg pain. Will check ABIs           Relevant Orders    Vascular US Ankle Brachial Index (DWAYNE) With Exercise    Vascular US Ankle Brachial Index (DWAYNE) Without Exercise    Prediabetes    Current Assessment & Plan     - Encouraged healthy lifestyle, including adequate exercise and high fiber, low fat and low carb diet.          Recurrent cold sores    Current Assessment & Plan     -continue valtrex as needed for flare.          Other Visit Diagnoses       Medicare annual wellness visit, subsequent    -  Primary

## 2024-02-24 DIAGNOSIS — I10 ESSENTIAL HYPERTENSION, BENIGN: ICD-10-CM

## 2024-02-27 RX ORDER — CHLORTHALIDONE 25 MG/1
25 TABLET ORAL DAILY
Qty: 90 TABLET | Refills: 3 | OUTPATIENT
Start: 2024-02-27

## 2024-03-12 DIAGNOSIS — I10 ESSENTIAL HYPERTENSION, BENIGN: ICD-10-CM

## 2024-03-12 DIAGNOSIS — E78.2 MIXED HYPERLIPIDEMIA: ICD-10-CM

## 2024-03-12 DIAGNOSIS — J30.9 ALLERGIC RHINITIS, UNSPECIFIED SEASONALITY, UNSPECIFIED TRIGGER: ICD-10-CM

## 2024-03-12 RX ORDER — FLUTICASONE PROPIONATE 50 MCG
2 SPRAY, SUSPENSION (ML) NASAL DAILY
Qty: 48 G | Refills: 1 | Status: SHIPPED | OUTPATIENT
Start: 2024-03-12

## 2024-03-12 RX ORDER — CHLORTHALIDONE 25 MG/1
25 TABLET ORAL DAILY
Qty: 90 TABLET | Refills: 3 | Status: SHIPPED | OUTPATIENT
Start: 2024-03-12 | End: 2024-05-29 | Stop reason: SDUPTHER

## 2024-03-12 RX ORDER — ATORVASTATIN CALCIUM 40 MG/1
40 TABLET, FILM COATED ORAL DAILY
Qty: 90 TABLET | Refills: 3 | Status: SHIPPED | OUTPATIENT
Start: 2024-03-12 | End: 2024-05-29 | Stop reason: SDUPTHER

## 2024-03-20 ENCOUNTER — HOSPITAL ENCOUNTER (OUTPATIENT)
Dept: VASCULAR MEDICINE | Facility: HOSPITAL | Age: 79
Discharge: HOME | End: 2024-03-20
Payer: MEDICARE

## 2024-03-20 DIAGNOSIS — M79.605 BILATERAL LEG PAIN: ICD-10-CM

## 2024-03-20 DIAGNOSIS — I73.9 PERIPHERAL VASCULAR DISEASE WITH CLAUDICATION (CMS-HCC): ICD-10-CM

## 2024-03-20 DIAGNOSIS — M79.604 BILATERAL LEG PAIN: ICD-10-CM

## 2024-03-20 PROCEDURE — 93924 LWR XTR VASC STDY BILAT: CPT | Performed by: SURGERY

## 2024-03-20 PROCEDURE — 93924 LWR XTR VASC STDY BILAT: CPT

## 2024-03-28 ENCOUNTER — TELEPHONE (OUTPATIENT)
Dept: PRIMARY CARE | Facility: CLINIC | Age: 79
End: 2024-03-28
Payer: MEDICARE

## 2024-03-28 DIAGNOSIS — I10 ESSENTIAL HYPERTENSION, BENIGN: ICD-10-CM

## 2024-03-28 RX ORDER — METOPROLOL SUCCINATE 50 MG/1
50 TABLET, EXTENDED RELEASE ORAL DAILY
Qty: 90 TABLET | Refills: 1 | Status: SHIPPED | OUTPATIENT
Start: 2024-03-28 | End: 2024-09-24

## 2024-04-22 ENCOUNTER — TELEPHONE (OUTPATIENT)
Dept: PRIMARY CARE | Facility: CLINIC | Age: 79
End: 2024-04-22
Payer: MEDICARE

## 2024-04-22 NOTE — TELEPHONE ENCOUNTER
Pt stopped in last week to see if she could get a referral for orthopedic doctor. Has a lump on the back of her knee and her ins nurse came out for their regular check and she said she should probably get that looked at by a doctor.  Did you want to see her in office or can you place referral. Told her we would call her back.

## 2024-04-23 ENCOUNTER — HOSPITAL ENCOUNTER (OUTPATIENT)
Dept: RADIOLOGY | Facility: HOSPITAL | Age: 79
Discharge: HOME | End: 2024-04-23
Payer: MEDICARE

## 2024-04-23 ENCOUNTER — OFFICE VISIT (OUTPATIENT)
Dept: PRIMARY CARE | Facility: CLINIC | Age: 79
End: 2024-04-23
Payer: MEDICARE

## 2024-04-23 VITALS
DIASTOLIC BLOOD PRESSURE: 70 MMHG | OXYGEN SATURATION: 96 % | WEIGHT: 123 LBS | HEART RATE: 69 BPM | SYSTOLIC BLOOD PRESSURE: 112 MMHG | BODY MASS INDEX: 23.22 KG/M2 | HEIGHT: 61 IN | RESPIRATION RATE: 14 BRPM

## 2024-04-23 DIAGNOSIS — M71.22 SYNOVIAL CYST OF LEFT POPLITEAL SPACE: Primary | ICD-10-CM

## 2024-04-23 DIAGNOSIS — M25.562 ACUTE PAIN OF LEFT KNEE: ICD-10-CM

## 2024-04-23 PROCEDURE — 3078F DIAST BP <80 MM HG: CPT | Performed by: FAMILY MEDICINE

## 2024-04-23 PROCEDURE — 73562 X-RAY EXAM OF KNEE 3: CPT | Mod: LT

## 2024-04-23 PROCEDURE — 73562 X-RAY EXAM OF KNEE 3: CPT | Mod: LEFT SIDE | Performed by: RADIOLOGY

## 2024-04-23 PROCEDURE — 99213 OFFICE O/P EST LOW 20 MIN: CPT | Performed by: FAMILY MEDICINE

## 2024-04-23 PROCEDURE — 1157F ADVNC CARE PLAN IN RCRD: CPT | Performed by: FAMILY MEDICINE

## 2024-04-23 PROCEDURE — 1160F RVW MEDS BY RX/DR IN RCRD: CPT | Performed by: FAMILY MEDICINE

## 2024-04-23 PROCEDURE — 3074F SYST BP LT 130 MM HG: CPT | Performed by: FAMILY MEDICINE

## 2024-04-23 PROCEDURE — 1123F ACP DISCUSS/DSCN MKR DOCD: CPT | Performed by: FAMILY MEDICINE

## 2024-04-23 PROCEDURE — 1159F MED LIST DOCD IN RCRD: CPT | Performed by: FAMILY MEDICINE

## 2024-04-23 ASSESSMENT — ENCOUNTER SYMPTOMS
DIARRHEA: 0
SHORTNESS OF BREATH: 0
DIAPHORESIS: 0
ABDOMINAL PAIN: 0
COUGH: 0
UNEXPECTED WEIGHT CHANGE: 0
HEADACHES: 0
WHEEZING: 0
SINUS PRESSURE: 0
SINUS PAIN: 0
CHEST TIGHTNESS: 0
CONSTIPATION: 0
CHILLS: 0
NUMBNESS: 0
DIZZINESS: 0
FEVER: 0
CONFUSION: 0
ADENOPATHY: 0
FREQUENCY: 0
ARTHRALGIAS: 1
VOMITING: 0
LIGHT-HEADEDNESS: 0
PALPITATIONS: 0
POLYDIPSIA: 0
NAUSEA: 0
DYSURIA: 0
SORE THROAT: 0
POLYPHAGIA: 0
HEMATURIA: 0
NERVOUS/ANXIOUS: 0
DYSPHORIC MOOD: 0

## 2024-04-23 ASSESSMENT — PATIENT HEALTH QUESTIONNAIRE - PHQ9
2. FEELING DOWN, DEPRESSED OR HOPELESS: NOT AT ALL
SUM OF ALL RESPONSES TO PHQ9 QUESTIONS 1 AND 2: 0
1. LITTLE INTEREST OR PLEASURE IN DOING THINGS: NOT AT ALL

## 2024-04-23 NOTE — PROGRESS NOTES
"Subjective   Patient ID: Deborah Boston is a 78 y.o. female who presents for Mass (Lump behind left knee).    HPI   Noticed lump behind left knee. She does have pain in the back of her knee as well. This started about one month ago. She notices that if she sits for awhile and then stands, her legs feel weak until she starts walking for a bit. Denies any injury.    Review of Systems   Constitutional:  Negative for chills, diaphoresis, fever and unexpected weight change.   HENT:  Negative for congestion, sinus pressure, sinus pain, sneezing and sore throat.    Respiratory:  Negative for cough, chest tightness, shortness of breath and wheezing.    Cardiovascular:  Negative for chest pain, palpitations and leg swelling.   Gastrointestinal:  Negative for abdominal pain, constipation, diarrhea, nausea and vomiting.   Endocrine: Negative for cold intolerance, heat intolerance, polydipsia, polyphagia and polyuria.   Genitourinary:  Negative for dysuria, frequency, hematuria and urgency.   Musculoskeletal:  Positive for arthralgias.   Neurological:  Negative for dizziness, syncope, light-headedness, numbness and headaches.   Hematological:  Negative for adenopathy.   Psychiatric/Behavioral:  Negative for confusion and dysphoric mood. The patient is not nervous/anxious.        Objective   /70 (BP Location: Right arm, Patient Position: Sitting, BP Cuff Size: Adult)   Pulse 69   Resp 14   Ht 1.549 m (5' 1\")   Wt 55.8 kg (123 lb)   SpO2 96%   BMI 23.24 kg/m²     Physical Exam  Vitals and nursing note reviewed.   Constitutional:       General: She is not in acute distress.     Appearance: Normal appearance.   HENT:      Head: Normocephalic and atraumatic.      Nose: Nose normal.   Eyes:      Extraocular Movements: Extraocular movements intact.      Conjunctiva/sclera: Conjunctivae normal.      Pupils: Pupils are equal, round, and reactive to light.   Cardiovascular:      Rate and Rhythm: Normal rate and regular " rhythm.      Heart sounds: No murmur heard.     No friction rub. No gallop.   Pulmonary:      Effort: Pulmonary effort is normal.      Breath sounds: Normal breath sounds. No wheezing, rhonchi or rales.   Abdominal:      General: Bowel sounds are normal. There is no distension.      Palpations: Abdomen is soft.      Tenderness: There is no abdominal tenderness.   Musculoskeletal:         General: Normal range of motion.      Cervical back: Normal range of motion and neck supple.      Comments: + baker's cyst behind left knee   Skin:     General: Skin is warm and dry.   Neurological:      General: No focal deficit present.      Mental Status: She is alert and oriented to person, place, and time.      Deep Tendon Reflexes: Reflexes normal.   Psychiatric:         Mood and Affect: Mood normal.         Behavior: Behavior normal.         Thought Content: Thought content normal.         Judgment: Judgment normal.         Assessment/Plan   Problem List Items Addressed This Visit             ICD-10-CM    Acute pain of left knee M25.562     - refer to orthopedics  - check x-ray   - tylenol or ibuprofen as needed for pain         Relevant Orders    XR knee left 4+ views    Referral to Orthopaedic Surgery    Synovial cyst of left popliteal space - Primary M71.22     - refer to orthopedics          Relevant Orders    Referral to Orthopaedic Surgery

## 2024-04-23 NOTE — PATIENT INSTRUCTIONS
Deborah Boston ,    Thank you for coming in today. We at [facility] appreciate your trust in our care. If you have any questions or concerns about the care you received today, please do not hesitate to contact us at [phone].    The following instructions were discussed today:    - get x-ray   - take acetaminophen or ibuprofen as needed for knee pain   - refer to orthopedics   - Follow up 5/29/2024 as scheduled.

## 2024-05-01 ENCOUNTER — OFFICE VISIT (OUTPATIENT)
Dept: ORTHOPEDIC SURGERY | Facility: CLINIC | Age: 79
End: 2024-05-01
Payer: MEDICARE

## 2024-05-01 VITALS — HEIGHT: 61 IN | WEIGHT: 123 LBS | BODY MASS INDEX: 23.22 KG/M2

## 2024-05-01 DIAGNOSIS — M17.12 PRIMARY OSTEOARTHRITIS OF LEFT KNEE: ICD-10-CM

## 2024-05-01 DIAGNOSIS — M71.22 SYNOVIAL CYST OF LEFT POPLITEAL SPACE: ICD-10-CM

## 2024-05-01 DIAGNOSIS — M25.562 ACUTE PAIN OF LEFT KNEE: ICD-10-CM

## 2024-05-01 PROCEDURE — 1160F RVW MEDS BY RX/DR IN RCRD: CPT | Performed by: SPECIALIST

## 2024-05-01 PROCEDURE — 1159F MED LIST DOCD IN RCRD: CPT | Performed by: SPECIALIST

## 2024-05-01 PROCEDURE — 99214 OFFICE O/P EST MOD 30 MIN: CPT | Performed by: SPECIALIST

## 2024-05-01 PROCEDURE — 1157F ADVNC CARE PLAN IN RCRD: CPT | Performed by: SPECIALIST

## 2024-05-01 PROCEDURE — 20610 DRAIN/INJ JOINT/BURSA W/O US: CPT | Performed by: SPECIALIST

## 2024-05-01 PROCEDURE — 1123F ACP DISCUSS/DSCN MKR DOCD: CPT | Performed by: SPECIALIST

## 2024-05-01 NOTE — PROGRESS NOTES
New patient, left knee pain, xrays done 4/23/24. She states that she noticed a lump behind her knee had has been having left knee pain. She states that her legs feel weak if she sits for a while then tries to stand up. Denies any injury to her left leg. She has been taking Tylenol and Ibuprofen as needed. Pain for about a month.   HPI as noted above  EXAM:    GENERAL: A/Ox3, NAD. Appears healthy, well nourished  SKIN: no erythema, rashes, or ecchymoses     MUSCULOSKELETAL:  Laterality: Left knee Exam  - Alignment: partially correctible varus deformity  - ROM: Full with mild crepitus and pain  - Effusion: none, bilateral knee popliteal space mild swelling  - Strength: knee extension and flexion 5/5, EHL/PF/DF motor intact  - Palpation: TTP mostly along medial joint line  - Stability: Anterior/Posterior stable, varus/valgus stable. Mild pseudo valgus instability  - Gait: mild antalgic  - Hip Exam: flexion to 100+ degrees, full extension, internal/external rotation adequate, and no pain with log roll  - Special Tests: none performed    NEUROVASCULAR:  - Neurovascular Status: sensation intact to light touch distally  - Capillary refill brisk at extremities, Bilateral dorsalis pedis pulse 2+    RADIOGRAPHS moderate osteoarthritic change left knee especially medial compartment    ASSESSMENT left knee osteoarthritis with Baker's cyst    PLAN we described in great detail treatment options.  She tolerated a Kenalog injection to the left knee and is given a prescription to physical therapy.  Follow-up if no improvement  L Inj/Asp: L knee on 5/2/2024 7:26 AM  Indications: pain  Details: 22 G needle, anterolateral approach  Medications: 1 mL lidocaine 10 mg/mL (1 %); 40 mg triamcinolone acetonide 40 mg/mL  Outcome: tolerated well, no immediate complications  Procedure, treatment alternatives, risks and benefits explained, specific risks discussed. Consent was given by the patient. Immediately prior to procedure a time out was  called to verify the correct patient, procedure, equipment, support staff and site/side marked as required. Patient was prepped and draped in the usual sterile fashion.           This note was dictated using speech recognition software and was not corrected for spelling or grammatical errors

## 2024-05-02 RX ORDER — TRIAMCINOLONE ACETONIDE 40 MG/ML
40 INJECTION, SUSPENSION INTRA-ARTICULAR; INTRAMUSCULAR
Status: COMPLETED | OUTPATIENT
Start: 2024-05-02 | End: 2024-05-02

## 2024-05-02 RX ORDER — LIDOCAINE HYDROCHLORIDE 10 MG/ML
1 INJECTION INFILTRATION; PERINEURAL
Status: COMPLETED | OUTPATIENT
Start: 2024-05-02 | End: 2024-05-02

## 2024-05-02 RX ADMIN — TRIAMCINOLONE ACETONIDE 40 MG: 40 INJECTION, SUSPENSION INTRA-ARTICULAR; INTRAMUSCULAR at 07:26

## 2024-05-02 RX ADMIN — LIDOCAINE HYDROCHLORIDE 1 ML: 10 INJECTION INFILTRATION; PERINEURAL at 07:26

## 2024-05-03 DIAGNOSIS — M19.032 CMC DJD(CARPOMETACARPAL DEGENERATIVE JOINT DISEASE), LOCALIZED PRIMARY, LEFT: ICD-10-CM

## 2024-05-07 ENCOUNTER — TELEPHONE (OUTPATIENT)
Dept: PRIMARY CARE | Facility: CLINIC | Age: 79
End: 2024-05-07
Payer: MEDICARE

## 2024-05-09 ENCOUNTER — OFFICE VISIT (OUTPATIENT)
Dept: ORTHOPEDIC SURGERY | Facility: CLINIC | Age: 79
End: 2024-05-09
Payer: MEDICARE

## 2024-05-09 ENCOUNTER — HOSPITAL ENCOUNTER (OUTPATIENT)
Dept: RADIOLOGY | Facility: HOSPITAL | Age: 79
Discharge: HOME | End: 2024-05-09
Payer: MEDICARE

## 2024-05-09 VITALS — WEIGHT: 120 LBS | HEIGHT: 61 IN | BODY MASS INDEX: 22.66 KG/M2

## 2024-05-09 DIAGNOSIS — M19.032 CMC DJD(CARPOMETACARPAL DEGENERATIVE JOINT DISEASE), LOCALIZED PRIMARY, LEFT: ICD-10-CM

## 2024-05-09 DIAGNOSIS — M19.032 CMC DJD(CARPOMETACARPAL DEGENERATIVE JOINT DISEASE), LOCALIZED PRIMARY, LEFT: Primary | ICD-10-CM

## 2024-05-09 DIAGNOSIS — M65.341 TRIGGER RING FINGER OF RIGHT HAND: ICD-10-CM

## 2024-05-09 PROCEDURE — 20550 NJX 1 TENDON SHEATH/LIGAMENT: CPT | Performed by: ORTHOPAEDIC SURGERY

## 2024-05-09 PROCEDURE — 73130 X-RAY EXAM OF HAND: CPT | Mod: LT

## 2024-05-09 PROCEDURE — 99214 OFFICE O/P EST MOD 30 MIN: CPT | Performed by: ORTHOPAEDIC SURGERY

## 2024-05-09 PROCEDURE — 1160F RVW MEDS BY RX/DR IN RCRD: CPT | Performed by: ORTHOPAEDIC SURGERY

## 2024-05-09 PROCEDURE — 73130 X-RAY EXAM OF HAND: CPT | Mod: LEFT SIDE | Performed by: INTERNAL MEDICINE

## 2024-05-09 PROCEDURE — 1159F MED LIST DOCD IN RCRD: CPT | Performed by: ORTHOPAEDIC SURGERY

## 2024-05-09 PROCEDURE — 1123F ACP DISCUSS/DSCN MKR DOCD: CPT | Performed by: ORTHOPAEDIC SURGERY

## 2024-05-09 PROCEDURE — 20600 DRAIN/INJ JOINT/BURSA W/O US: CPT | Performed by: ORTHOPAEDIC SURGERY

## 2024-05-09 PROCEDURE — 1157F ADVNC CARE PLAN IN RCRD: CPT | Performed by: ORTHOPAEDIC SURGERY

## 2024-05-09 RX ORDER — TRIAMCINOLONE ACETONIDE 40 MG/ML
40 INJECTION, SUSPENSION INTRA-ARTICULAR; INTRAMUSCULAR
Status: COMPLETED | OUTPATIENT
Start: 2024-05-09 | End: 2024-05-09

## 2024-05-09 RX ORDER — LIDOCAINE HYDROCHLORIDE 10 MG/ML
1 INJECTION INFILTRATION; PERINEURAL
Status: COMPLETED | OUTPATIENT
Start: 2024-05-09 | End: 2024-05-09

## 2024-05-09 RX ADMIN — LIDOCAINE HYDROCHLORIDE 1 ML: 10 INJECTION INFILTRATION; PERINEURAL at 10:59

## 2024-05-09 RX ADMIN — TRIAMCINOLONE ACETONIDE 40 MG: 40 INJECTION, SUSPENSION INTRA-ARTICULAR; INTRAMUSCULAR at 10:59

## 2024-05-09 ASSESSMENT — PAIN - FUNCTIONAL ASSESSMENT: PAIN_FUNCTIONAL_ASSESSMENT: NO/DENIES PAIN

## 2024-05-09 NOTE — PROGRESS NOTES
78 y.o. female presents today for follow-up of left thumb base pain and now new onset right ring finger catching clicking and locking pain. The patient reports symptoms for weeks, getting worse recently. Pain is controlled. Patient reports no numbness and tingling.  Reports no previous surgeries or trauma to the area.  Reports pain worse with use, better at rest.   Pain dull ache, sharp at times.  Pain worse opening bottles and jars in her left thumb.  Had a shot there about 9 months ago that helped until recently.  Would like to try another    Review of Systems    Constitutional: see HPI, no fever, no chills, not feeling tired, no significant weight gain or weight loss.   Eyes: No vision changes  ENT: no nosebleeds.   Cardiovascular: no chest pain.   Respiratory: no shortness of breath and no cough.   Gastrointestinal: no abdominal pain, no nausea, no vomiting and no diarrhea.   Musculoskeletal: per HPI  Neurological: no headache, no gait disturbances  Psychiatric: no depression and no sleep disturbances.   Endocrine: no muscle weakness and no muscle cramps.   Hematologic/Lymphatic: no swollen glands and no tendency for easy bruising or excessive swelling.     Patient's past medical history, past surgical history, allergies, and medications have been reviewed unless otherwise noted in the chart.     CMC Arthritis Exam  Inspection:  no evidence of infection, no edema, no erythema, no ecchymosis, Palpation:  compartments are soft, pain with palpation over the Thumb CMC joint, Range of Motion:  full wrist and finger range of motion, Stability:  no wrist or finger instability detected, Strength:  5/5  and pinch strength, Skin:  intact, Vascular:  capillary refill <2 seconds distally, Sensation:  intact to light touch distally, Tests:  negative Finkelstein's , positive Thumb CMC Grind.      Trigger Exam  Digit: Right ring finger inspection:  no evidence of infection, no erythema, no edema, no ecchymosis, Palpation:   compartments are soft, TTP A1 pulley Range of Motion:  full composite finger flexion, Stability:  no finger instability, Strength:  5/5 strength with flexion and extension, Skin:  intact, Vascular:  capillary refill <2 seconds distally, Sensation:  sensation intact to light touch distally, Other:  no pain with palpation or motion proximally in the wrist.     Constitutional   General appearance: Alert and in no acute distress. Well developed, well nourished.    Eyes   External Eye, Conjunctiva and lids: Normal external exam - pupils were equal in size, round, reactive to light (PERRL) with normal accommodation and extraocular movements intact (EOMI).   Ears, Nose, Mouth, and Throat   Hearing: Normal.   Neck   Neck: No neck mass was observed. Supple.   Pulmonary   Respiratory effort: No respiratory distress.   Cardiovascular   Examination of extremities: No peripheral edema.   Psychiatric   Judgment and insight: Intact.   Orientation to person, place, and time: Alert and oriented x 3.       Mood and affect: Normal.      Left thumb CMC DJD  Based on the history, physical exam and imaging studies above, the patient's presentation is consistent with the above diagnosis.  I had a long discussion with the patient regarding their presentation and the treatment options.  We discussed initial nonoperative versus operative management options as well as potential further diagnostic imaging.  We again discussed her treatment options going forward along with their associated risks and benefits. After thorough discussion, the patient has elected to proceed with conservative management. All questions were answered to the patients satisfaction who seems satisfied with the plan.  They will call the office with any questions/concerns.    Discussion I discussed the diagnosis and treatment options with the patient today along with their associated risks and benefits. After thorough discussion, the patient has elected to proceed with a  corticosteroid injection with Kenalog and Xylocaine, which was performed in the office today under aseptic technique and the patient tolerated the procedure well.          Ortho Injections:           Injection site left thumb CMC. Medication 1 cc 1% Xylocaine, 1 cc 40 mg Kenalog, Injection given under sterile conditions. No immediate complications noted. Post injection instructions given.  Comfort Cool as needed  Voltaren gel as needed  Follow-up 4 months as needed    Right ring finger trigger  Based on the history, physical exam and imaging studies above, the patient's presentation is consistent with the above diagnosis.  I had a long discussion with the patient regarding their presentation and the treatment options.  We discussed initial nonoperative versus operative management options as well as potential further diagnostic imaging.  We again discussed her treatment options going forward along with their associated risks and benefits. After thorough discussion, the patient has elected to proceed with conservative management. All questions were answered to the patients satisfaction who seems satisfied with the plan.  They will call the office with any questions/concerns.    Discussion I discussed the diagnosis and treatment options with the patient today along with their associated risks and benefits. After thorough discussion, the patient has elected to proceed with a corticosteroid injection with Kenalog and Xylocaine, which was performed in the office today under aseptic technique and the patient tolerated the procedure well.          Ortho Injections:           Injection site right ring finger A1 pulley. Medication 1 cc 1% Xylocaine, 1 cc 10 mg Kenalog, Injection given under sterile conditions. No immediate complications noted. Post injection instructions given.  Follow-up 8 weeks as needed    Patient ID: Deborah Boston is a 78 y.o. female.    Hand / UE Inj/Asp: R ring A1 for trigger finger on 5/9/2024  10:59 AM  Indications: therapeutic  Details: 25 G needle, volar approach  Medications: 1 mL lidocaine 10 mg/mL (1 %); 10 mg triamcinolone acetonide 10 mg/mL  Outcome: tolerated well, no immediate complications  Procedure, treatment alternatives, risks and benefits explained, specific risks discussed. Consent was given by the patient. Immediately prior to procedure a time out was called to verify the correct patient, procedure, equipment, support staff and site/side marked as required. Patient was prepped and draped in the usual sterile fashion.       S Inj/Asp: L thumb CMC on 5/9/2024 10:59 AM  Indications: pain  Details: 25 G needle (dorsoradial) approach  Medications: 40 mg triamcinolone acetonide 40 mg/mL; 1 mL lidocaine 10 mg/mL (1 %)  Outcome: tolerated well, no immediate complications  Procedure, treatment alternatives, risks and benefits explained, specific risks discussed. Consent was given by the patient. Immediately prior to procedure a time out was called to verify the correct patient, procedure, equipment, support staff and site/side marked as required. Patient was prepped and draped in the usual sterile fashion.

## 2024-05-09 NOTE — PROGRESS NOTES
Follow up left hand pain.  She was last injected in her Left CMC on 08/17/2023.  She did have good relief until recently.  She also has problems her right ring finger locking up for about 3 months.

## 2024-05-10 ENCOUNTER — APPOINTMENT (OUTPATIENT)
Dept: RADIOLOGY | Facility: HOSPITAL | Age: 79
End: 2024-05-10
Payer: MEDICARE

## 2024-05-10 ENCOUNTER — EVALUATION (OUTPATIENT)
Dept: PHYSICAL THERAPY | Facility: HOSPITAL | Age: 79
End: 2024-05-10
Payer: MEDICARE

## 2024-05-10 ENCOUNTER — HOSPITAL ENCOUNTER (EMERGENCY)
Facility: HOSPITAL | Age: 79
Discharge: HOME | End: 2024-05-10
Attending: EMERGENCY MEDICINE
Payer: MEDICARE

## 2024-05-10 VITALS
DIASTOLIC BLOOD PRESSURE: 71 MMHG | BODY MASS INDEX: 23.22 KG/M2 | HEART RATE: 70 BPM | HEIGHT: 61 IN | RESPIRATION RATE: 18 BRPM | SYSTOLIC BLOOD PRESSURE: 142 MMHG | OXYGEN SATURATION: 97 % | TEMPERATURE: 98.1 F | WEIGHT: 123 LBS

## 2024-05-10 DIAGNOSIS — I10 ELEVATED BLOOD PRESSURE READING WITH DIAGNOSIS OF HYPERTENSION: ICD-10-CM

## 2024-05-10 DIAGNOSIS — R29.898 LEG WEAKNESS, BILATERAL: Primary | ICD-10-CM

## 2024-05-10 DIAGNOSIS — R51.9 ACUTE NONINTRACTABLE HEADACHE, UNSPECIFIED HEADACHE TYPE: Primary | ICD-10-CM

## 2024-05-10 DIAGNOSIS — J01.00 ACUTE NON-RECURRENT MAXILLARY SINUSITIS: ICD-10-CM

## 2024-05-10 DIAGNOSIS — E87.6 HYPOKALEMIA: ICD-10-CM

## 2024-05-10 DIAGNOSIS — M17.12 PRIMARY OSTEOARTHRITIS OF LEFT KNEE: ICD-10-CM

## 2024-05-10 LAB
ALBUMIN SERPL BCP-MCNC: 4.7 G/DL (ref 3.4–5)
ALP SERPL-CCNC: 54 U/L (ref 33–136)
ALT SERPL W P-5'-P-CCNC: 17 U/L (ref 7–45)
ANION GAP SERPL CALC-SCNC: 11 MMOL/L (ref 10–20)
APPEARANCE UR: CLEAR
AST SERPL W P-5'-P-CCNC: 16 U/L (ref 9–39)
BASOPHILS # BLD AUTO: 0.02 X10*3/UL (ref 0–0.1)
BASOPHILS NFR BLD AUTO: 0.1 %
BILIRUB SERPL-MCNC: 0.9 MG/DL (ref 0–1.2)
BILIRUB UR STRIP.AUTO-MCNC: NEGATIVE MG/DL
BUN SERPL-MCNC: 19 MG/DL (ref 6–23)
CALCIUM SERPL-MCNC: 9.7 MG/DL (ref 8.6–10.3)
CHLORIDE SERPL-SCNC: 98 MMOL/L (ref 98–107)
CO2 SERPL-SCNC: 28 MMOL/L (ref 21–32)
COLOR UR: NORMAL
CREAT SERPL-MCNC: 0.63 MG/DL (ref 0.5–1.05)
EGFRCR SERPLBLD CKD-EPI 2021: >90 ML/MIN/1.73M*2
EOSINOPHIL # BLD AUTO: 0.01 X10*3/UL (ref 0–0.4)
EOSINOPHIL NFR BLD AUTO: 0.1 %
ERYTHROCYTE [DISTWIDTH] IN BLOOD BY AUTOMATED COUNT: 12.1 % (ref 11.5–14.5)
ERYTHROCYTE [SEDIMENTATION RATE] IN BLOOD BY WESTERGREN METHOD: 4 MM/H (ref 0–30)
GLUCOSE SERPL-MCNC: 131 MG/DL (ref 74–99)
GLUCOSE UR STRIP.AUTO-MCNC: NEGATIVE MG/DL
HCT VFR BLD AUTO: 41.9 % (ref 36–46)
HGB BLD-MCNC: 15 G/DL (ref 12–16)
HOLD SPECIMEN: 293
IMM GRANULOCYTES # BLD AUTO: 0.17 X10*3/UL (ref 0–0.5)
IMM GRANULOCYTES NFR BLD AUTO: 1 % (ref 0–0.9)
KETONES UR STRIP.AUTO-MCNC: NEGATIVE MG/DL
LACTATE SERPL-SCNC: 1 MMOL/L (ref 0.4–2)
LEUKOCYTE ESTERASE UR QL STRIP.AUTO: NEGATIVE
LYMPHOCYTES # BLD AUTO: 2.52 X10*3/UL (ref 0.8–3)
LYMPHOCYTES NFR BLD AUTO: 15.1 %
MAGNESIUM SERPL-MCNC: 1.98 MG/DL (ref 1.6–2.4)
MCH RBC QN AUTO: 31.1 PG (ref 26–34)
MCHC RBC AUTO-ENTMCNC: 35.8 G/DL (ref 32–36)
MCV RBC AUTO: 87 FL (ref 80–100)
MONOCYTES # BLD AUTO: 0.65 X10*3/UL (ref 0.05–0.8)
MONOCYTES NFR BLD AUTO: 3.9 %
NEUTROPHILS # BLD AUTO: 13.31 X10*3/UL (ref 1.6–5.5)
NEUTROPHILS NFR BLD AUTO: 79.8 %
NITRITE UR QL STRIP.AUTO: NEGATIVE
NRBC BLD-RTO: 0.1 /100 WBCS (ref 0–0)
PH UR STRIP.AUTO: 7 [PH]
PLATELET # BLD AUTO: 281 X10*3/UL (ref 150–450)
POTASSIUM SERPL-SCNC: 3.3 MMOL/L (ref 3.5–5.3)
PROT SERPL-MCNC: 7.3 G/DL (ref 6.4–8.2)
PROT UR STRIP.AUTO-MCNC: NEGATIVE MG/DL
RBC # BLD AUTO: 4.83 X10*6/UL (ref 4–5.2)
RBC # UR STRIP.AUTO: NEGATIVE /UL
SODIUM SERPL-SCNC: 134 MMOL/L (ref 136–145)
SP GR UR STRIP.AUTO: 1.01
UROBILINOGEN UR STRIP.AUTO-MCNC: <2 MG/DL
WBC # BLD AUTO: 16.7 X10*3/UL (ref 4.4–11.3)

## 2024-05-10 PROCEDURE — 80053 COMPREHEN METABOLIC PANEL: CPT | Performed by: NURSE PRACTITIONER

## 2024-05-10 PROCEDURE — 2500000006 HC RX 250 W HCPCS SELF ADMINISTERED DRUGS (ALT 637 FOR ALL PAYERS): Performed by: NURSE PRACTITIONER

## 2024-05-10 PROCEDURE — 83605 ASSAY OF LACTIC ACID: CPT | Performed by: NURSE PRACTITIONER

## 2024-05-10 PROCEDURE — 99284 EMERGENCY DEPT VISIT MOD MDM: CPT | Mod: 25

## 2024-05-10 PROCEDURE — 96361 HYDRATE IV INFUSION ADD-ON: CPT

## 2024-05-10 PROCEDURE — 83735 ASSAY OF MAGNESIUM: CPT | Performed by: NURSE PRACTITIONER

## 2024-05-10 PROCEDURE — 97161 PT EVAL LOW COMPLEX 20 MIN: CPT | Mod: GP | Performed by: PHYSICAL THERAPIST

## 2024-05-10 PROCEDURE — 2500000004 HC RX 250 GENERAL PHARMACY W/ HCPCS (ALT 636 FOR OP/ED): Performed by: NURSE PRACTITIONER

## 2024-05-10 PROCEDURE — 70450 CT HEAD/BRAIN W/O DYE: CPT

## 2024-05-10 PROCEDURE — 70450 CT HEAD/BRAIN W/O DYE: CPT | Performed by: RADIOLOGY

## 2024-05-10 PROCEDURE — 81003 URINALYSIS AUTO W/O SCOPE: CPT | Performed by: NURSE PRACTITIONER

## 2024-05-10 PROCEDURE — 2500000001 HC RX 250 WO HCPCS SELF ADMINISTERED DRUGS (ALT 637 FOR MEDICARE OP): Performed by: NURSE PRACTITIONER

## 2024-05-10 PROCEDURE — 85652 RBC SED RATE AUTOMATED: CPT | Performed by: NURSE PRACTITIONER

## 2024-05-10 PROCEDURE — 36415 COLL VENOUS BLD VENIPUNCTURE: CPT | Performed by: NURSE PRACTITIONER

## 2024-05-10 PROCEDURE — 96374 THER/PROPH/DIAG INJ IV PUSH: CPT

## 2024-05-10 PROCEDURE — 85025 COMPLETE CBC W/AUTO DIFF WBC: CPT | Performed by: NURSE PRACTITIONER

## 2024-05-10 RX ORDER — ACETAMINOPHEN 325 MG/1
975 TABLET ORAL ONCE
Status: COMPLETED | OUTPATIENT
Start: 2024-05-10 | End: 2024-05-10

## 2024-05-10 RX ORDER — KETOROLAC TROMETHAMINE 30 MG/ML
15 INJECTION, SOLUTION INTRAMUSCULAR; INTRAVENOUS ONCE
Status: COMPLETED | OUTPATIENT
Start: 2024-05-10 | End: 2024-05-10

## 2024-05-10 RX ORDER — CLONIDINE HYDROCHLORIDE 0.1 MG/1
0.1 TABLET ORAL ONCE
Status: COMPLETED | OUTPATIENT
Start: 2024-05-10 | End: 2024-05-10

## 2024-05-10 RX ORDER — DOXYCYCLINE 100 MG/1
100 CAPSULE ORAL ONCE
Status: COMPLETED | OUTPATIENT
Start: 2024-05-10 | End: 2024-05-10

## 2024-05-10 RX ORDER — POTASSIUM CHLORIDE 750 MG/1
20 TABLET, FILM COATED, EXTENDED RELEASE ORAL ONCE
Status: COMPLETED | OUTPATIENT
Start: 2024-05-10 | End: 2024-05-10

## 2024-05-10 RX ORDER — ACETAMINOPHEN 325 MG/1
TABLET ORAL
Status: COMPLETED
Start: 2024-05-10 | End: 2024-05-10

## 2024-05-10 RX ORDER — DOXYCYCLINE HYCLATE 100 MG
100 TABLET ORAL 2 TIMES DAILY
Qty: 14 TABLET | Refills: 0 | Status: SHIPPED | OUTPATIENT
Start: 2024-05-10 | End: 2024-05-17

## 2024-05-10 RX ADMIN — POTASSIUM CHLORIDE 20 MEQ: 750 TABLET, FILM COATED, EXTENDED RELEASE ORAL at 18:06

## 2024-05-10 RX ADMIN — SODIUM CHLORIDE 1000 ML: 9 INJECTION, SOLUTION INTRAVENOUS at 16:19

## 2024-05-10 RX ADMIN — DOXYCYCLINE 100 MG: 100 CAPSULE ORAL at 20:20

## 2024-05-10 RX ADMIN — KETOROLAC TROMETHAMINE 15 MG: 30 INJECTION, SOLUTION INTRAMUSCULAR at 18:06

## 2024-05-10 RX ADMIN — ACETAMINOPHEN 975 MG: 325 TABLET ORAL at 16:17

## 2024-05-10 RX ADMIN — CLONIDINE HYDROCHLORIDE 0.1 MG: 0.1 TABLET ORAL at 18:06

## 2024-05-10 ASSESSMENT — LIFESTYLE VARIABLES
EVER HAD A DRINK FIRST THING IN THE MORNING TO STEADY YOUR NERVES TO GET RID OF A HANGOVER: NO
HAVE YOU EVER FELT YOU SHOULD CUT DOWN ON YOUR DRINKING: NO
TOTAL SCORE: 0
HAVE PEOPLE ANNOYED YOU BY CRITICIZING YOUR DRINKING: NO
EVER FELT BAD OR GUILTY ABOUT YOUR DRINKING: NO

## 2024-05-10 ASSESSMENT — PAIN DESCRIPTION - LOCATION: LOCATION: HEAD

## 2024-05-10 ASSESSMENT — COLUMBIA-SUICIDE SEVERITY RATING SCALE - C-SSRS
1. IN THE PAST MONTH, HAVE YOU WISHED YOU WERE DEAD OR WISHED YOU COULD GO TO SLEEP AND NOT WAKE UP?: NO
6. HAVE YOU EVER DONE ANYTHING, STARTED TO DO ANYTHING, OR PREPARED TO DO ANYTHING TO END YOUR LIFE?: NO
2. HAVE YOU ACTUALLY HAD ANY THOUGHTS OF KILLING YOURSELF?: NO

## 2024-05-10 ASSESSMENT — ENCOUNTER SYMPTOMS
DEPRESSION: 1
OCCASIONAL FEELINGS OF UNSTEADINESS: 0
LOSS OF SENSATION IN FEET: 0

## 2024-05-10 ASSESSMENT — PAIN - FUNCTIONAL ASSESSMENT
PAIN_FUNCTIONAL_ASSESSMENT: 0-10
PAIN_FUNCTIONAL_ASSESSMENT: 0-10

## 2024-05-10 ASSESSMENT — PATIENT HEALTH QUESTIONNAIRE - PHQ9
SUM OF ALL RESPONSES TO PHQ9 QUESTIONS 1 AND 2: 1
2. FEELING DOWN, DEPRESSED OR HOPELESS: SEVERAL DAYS
10. IF YOU CHECKED OFF ANY PROBLEMS, HOW DIFFICULT HAVE THESE PROBLEMS MADE IT FOR YOU TO DO YOUR WORK, TAKE CARE OF THINGS AT HOME, OR GET ALONG WITH OTHER PEOPLE: NOT DIFFICULT AT ALL
1. LITTLE INTEREST OR PLEASURE IN DOING THINGS: NOT AT ALL

## 2024-05-10 ASSESSMENT — PAIN SCALES - GENERAL
PAINLEVEL_OUTOF10: 8
PAINLEVEL_OUTOF10: 2
PAINLEVEL_OUTOF10: 6
PAINLEVEL_OUTOF10: 2

## 2024-05-10 ASSESSMENT — PAIN DESCRIPTION - DESCRIPTORS: DESCRIPTORS: PRESSURE

## 2024-05-10 ASSESSMENT — PAIN DESCRIPTION - PROGRESSION
CLINICAL_PROGRESSION: NOT CHANGED
CLINICAL_PROGRESSION: RAPIDLY IMPROVING

## 2024-05-10 ASSESSMENT — PAIN DESCRIPTION - ONSET: ONSET: ONGOING

## 2024-05-10 ASSESSMENT — VISUAL ACUITY: OU: 1

## 2024-05-10 ASSESSMENT — PAIN DESCRIPTION - FREQUENCY: FREQUENCY: CONSTANT/CONTINUOUS

## 2024-05-10 NOTE — ED PROVIDER NOTES
HPI   Chief Complaint   Patient presents with    Headache     Pt had a cortisone shot two weeks ago and had an allergic reaction. Pt has had a headache since then. Pt denies light sensitivity. Pt has some nausea. Pt states tylenol and ibuprofen do not help.         Patient presents the emergency department for evaluation of a headache for the last 2 weeks.  Patient states she has had a headache ever since she got a cortisone injection to her left knee with Dr. Burroughs.  She states it is a frontal headache and constant.  She does have a history of migraines.  She does not find this to be the worst headache of her life or of thunderclap nature.  It was of gradual onset and just will go away.  It was associated with nasal congestion and still is however she is no longer blowing yellow-green out of her nose.  There is no subjective fever, earache, sore throat, chest pain, shortness of breath, numbness, weakness, nausea, vomiting, photosensitivity or seizure activity.  She has had some intermittent dizziness, dry mouth and urinary frequency.  She denies being a diabetic.  She has been able to go to physical therapy.  She does have a history of hypertension and has been monitoring her blood pressure at home.  She has not had recent change in her blood pressure medicines and has taken all her morning doses of medication and is due for 1 losartan 50 mg later before bed.  Her symptoms are aggravated by nothing in particular and are unalleviated.  There is no associated traumatic incident or anticoagulant use.  Her symptoms are moderate in severity and persistent nature.      History provided by:  Patient   used: No                        Macho Coma Scale Score: 15         NIH Stroke Scale: 0             Patient History   Past Medical History:   Diagnosis Date    Anesthesia of skin 10/07/2021    Numbness of fingers of both hands    Anesthesia of skin 10/07/2021    Numbness of fingers of both hands     Carpal tunnel syndrome, bilateral upper limbs 03/05/2023    Cervicalgia 11/23/2021    Neck pain    Cervicalgia 11/23/2021    Neck pain    Cervicalgia 11/23/2021    Neck pain    Disorder of the skin and subcutaneous tissue, unspecified 11/23/2021    Foot lesion    Disorder of the skin and subcutaneous tissue, unspecified 11/23/2021    Foot lesion    Encounter for screening for depression 02/03/2022    Positive screening for depression on 2-item Patient Health Questionnaire (PHQ-2)    History of colonic polyps 06/26/2023    Neck pain 03/05/2023    Occipital neuralgia 01/03/2020    Occipital neuralgia of left side    Other conditions influencing health status 01/06/2022    History of cough    Other specified cardiac arrhythmias     Sinus arrhythmia    Other specified counseling 05/09/2022    Advanced directives, counseling/discussion    Pain in left foot 10/29/2019    Left foot pain    Pelvic and perineal pain 07/24/2021    Pelvic pain    Personal history of other diseases of the circulatory system     History of hypertension    Personal history of other diseases of the respiratory system 05/09/2022    History of acute sinusitis    Personal history of other diseases of the respiratory system 05/09/2022    History of acute sinusitis    Personal history of other diseases of the respiratory system 05/26/2022    History of paranasal sinus congestion    Personal history of other drug therapy     COVID-19 vaccine series completed    Personal history of other endocrine, nutritional and metabolic disease     History of diabetes mellitus    Personal history of other mental and behavioral disorders     History of depression    Personal history of other specified conditions     History of urinary frequency    Personal history of other specified conditions 10/20/2022    History of urinary incontinence    Personal history of other specified conditions 12/06/2019    History of chest pain    Personal history of other specified  conditions 2022    History of urinary urgency    Personal history of urinary (tract) infections 10/20/2022    History of urinary tract infection    Unspecified abdominal pain 2021    Abdominal pressure    Unspecified osteoarthritis, unspecified site 2022    Arthritis    Unspecified osteoarthritis, unspecified site 2022    Arthritis    Unspecified osteoarthritis, unspecified site 2022    Arthritis    Unspecified osteoarthritis, unspecified site 2022    Arthritis     Past Surgical History:   Procedure Laterality Date    CARPAL TUNNEL RELEASE Right 2023    OTHER SURGICAL HISTORY  2019    Shoulder surgery    OTHER SURGICAL HISTORY  2019    Hysterectomy    OTHER SURGICAL HISTORY  2019    Tubal ligation    OTHER SURGICAL HISTORY  2019    Breast biopsy    OTHER SURGICAL HISTORY  2019    Hernia repair    OTHER SURGICAL HISTORY  2022    Carpal tunnel surgery     Family History   Problem Relation Name Age of Onset    Coronary artery disease Mother      Hypertension Mother      Coronary artery disease Father      Coronary artery disease Sister      Hypertension Sister      Coronary artery disease Brother      Hypertension Brother      Colon cancer Brother       Social History     Tobacco Use    Smoking status: Former     Current packs/day: 0.00     Types: Cigarettes     Quit date:      Years since quittin.3    Smokeless tobacco: Never   Vaping Use    Vaping status: Never Used   Substance Use Topics    Alcohol use: Not Currently    Drug use: Never       Physical Exam   ED Triage Vitals [05/10/24 1419]   Temperature Heart Rate Respirations BP   36.7 °C (98.1 °F) 68 18 149/77      Pulse Ox Temp src Heart Rate Source Patient Position   97 % -- -- Sitting      BP Location FiO2 (%)     Left arm --       Physical Exam  Vitals reviewed.   Constitutional:       General: She is not in acute distress.     Appearance: Normal appearance.   HENT:       Head: Normocephalic and atraumatic.      Right Ear: Hearing, ear canal and external ear normal. There is no impacted cerumen. No hemotympanum. Tympanic membrane is scarred.      Left Ear: Hearing, ear canal and external ear normal. There is no impacted cerumen. No hemotympanum. Tympanic membrane is scarred.      Nose: Congestion present. No rhinorrhea.      Right Sinus: Maxillary sinus tenderness and frontal sinus tenderness present.      Left Sinus: Maxillary sinus tenderness and frontal sinus tenderness present.      Mouth/Throat:      Lips: Pink.      Mouth: Mucous membranes are moist.      Pharynx: Oropharynx is clear. Uvula midline.   Eyes:      General: Lids are normal. Vision grossly intact. No allergic shiner.     Extraocular Movements: Extraocular movements intact.      Conjunctiva/sclera: Conjunctivae normal.      Pupils: Pupils are equal, round, and reactive to light.   Neck:      Vascular: No JVD.      Trachea: Trachea normal.   Cardiovascular:      Rate and Rhythm: Normal rate and regular rhythm.      Pulses:           Radial pulses are 2+ on the right side.      Heart sounds: Murmur heard.   Pulmonary:      Effort: Pulmonary effort is normal.      Breath sounds: Normal breath sounds. No wheezing or rhonchi.   Abdominal:      General: Bowel sounds are normal.      Palpations: Abdomen is soft.      Tenderness: There is no abdominal tenderness.   Musculoskeletal:      Cervical back: Full passive range of motion without pain and neck supple.      Right lower leg: No edema.      Left lower leg: No edema.      Comments: ARIAS randomly.   Skin:     General: Skin is warm and dry.      Capillary Refill: Capillary refill takes less than 2 seconds.   Neurological:      General: No focal deficit present.      Mental Status: She is alert and oriented to person, place, and time.      Cranial Nerves: Cranial nerves 2-12 are intact.      Sensory: Sensation is intact.      Motor: Motor function is intact.       Coordination: Coordination is intact.      Gait: Gait is intact.      Comments: NIHSS 0   Psychiatric:         Behavior: Behavior is cooperative.         ED Course & MDM   ED Course as of 05/10/24 2038   Fri May 10, 2024   1704 LEUKOCYTES (10*3/UL) IN BLOOD BY AUTOMATED COUNT, Spanish(!): 16.7 [NA]   1704 Sed Rate: 4 [NA]   1704 Lactate: 1.0 [NA]   1758 Patient reevaluated.  She reports her history of migraines is a term use loosely for bad headache.  She was never formally diagnosed with migraines.  She was given her CT report and lab results.  She states that she cannot leave her car or get a ride home therefore shared decision making for attempts to treat headache and BP without any sedatives.  Will give Toradol as suggested by Dr. Barnard and blood pressure medication with reevaluation. [NA]      ED Course User Index  [NA] Michelle Jaimes, APRN-CNP         Diagnoses as of 05/10/24 2038   Acute nonintractable headache, unspecified headache type   Acute non-recurrent maxillary sinusitis   Elevated blood pressure reading with diagnosis of hypertension   Hypokalemia       Medical Decision Making  Presents to the emergency department for evaluation of a headache for the last 2 weeks.  Differential diagnosis of sinusitis, hyperglycemia and electrolyte dyscrasia secondary to cortisone injection and migraine.  Will perform a CT of the brain despite her NIH stroke scale of 0 and no focal neurologic deficit.  Additionally will obtain baseline labs as she is having urinary frequency and dry mouth and there is suspicion that she has been hyperglycemic.  Will provide a liter of IV fluid while obtaining labs and a dose of Tylenol.  Will reconsider migraine cocktail after initial interventions and physician evaluation.    Patient evaluated by Dr. Barnard. Labs and diagnostics as resulted above with hypokalemia 3.3 which was supplemented with oral potassium otherwise no other significant electrolyte derangement.  There is no  elevated ESR suggestive of temporal arteritis.  No urinary tract infection.  There is a leukocytosis likely secondary to recent cortisone injection.  She does not appear meningeal.  Imaging as interpreted by radiologist with CT of the head without IV contrast showing no evidence of acute cortical infarct or intracranial hemorrhage.  She did have an episode of hypertension which we had a discussion of hypertensive urgency versus her clinical appearance of sinusitis as she had symptoms for 2 weeks with yellow-green drainage which she states she still has when she uses Flonase and she is having pressure pushing down on her teeth when she spoke on a recheck to Dr. Barnard.  That being said Dr. Barnard recommends treatment with doxycycline despite no interpretation no sinusitis on CT imaging.  Patient did have improvement after Toradol injection as well as clonidine in which she is appropriate for outpatient treatment and management.    Plan is for continuation of her daily blood pressure medications and home monitoring of her blood pressure, doxycycline 100 mg twice daily for 7 days and close outpatient follow-up with primary care for recheck with strict return precautions as discussed. Patient is non-toxic, not hypoxic and appropriate for this outpatient management plan which they prefer. Encouragement to arrange close follow up was discussed as well as provided in a written handout of discharge instructions. Patient was educated on signs of symptoms to watch for indicative of re-evaluation in the emergency department setting to include any worsening of current symptoms. They verbalized understanding of instructions and is amenable to this treatment plan with no social determinants of health that would obscure this plan. Patient departed in stable condition.         Procedure  Procedures     YUE Carver-MARVA  05/10/24 2038

## 2024-05-10 NOTE — LETTER
May 10, 2024    Michelle Hughes, PT  [address] [facility]  [facility] [address]    Patient: Deborah Boston   YOB: 1945   Date of Visit: 5/10/2024       Dear Cj Burroughs MD  [address] [address]    The attached plan of care is being sent to you because your patient’s medical reimbursement requires that you certify the plan of care. Your signature is required to allow uninterrupted insurance coverage.      You may indicate your approval by signing below and faxing this form back to us at Dept Fax: [phone].    Please call Dept: [phone] with any questions or concerns.    Thank you for this referral,        Michelle Hughes PT  [place] [id number] [facility] [address] [phone]    Payer: Payor: [employer] / Plan: [employer]ICARE / Product Type: *No Product type* /                                                                         Date:     Dear Michelle Hughes, PT,     Re: Ms. Deborah Boston, MRN:[id number]    I certify that I have reviewed the attached plan of care and it is medically necessary for Ms. Deborah Boston (1945) who is under my care.          ______________________________________                    _________________  Provider name and credentials                                           Date and time                                                                                           Plan of Care 5/10/24   Effective from: 5/10/2024  Effective to: 8/10/2024    Plan ID: [id number]            Participants as of Finalize on 5/10/2024    Name Type Comments Contact Info    Cj Burroughs MD Referring Provider  [phone]    Michelle Hguhes PT Physical Therapist  [phone]       Last Plan Note     Author: Michelle Hughes PT Status: Incomplete Last edited: 5/10/2024 10:15 AM       Physical Therapy    Physical Therapy  Evaluation    Patient Name: Deborah Boston  MRN: [id number]  Today's Date: 5/10/2024  Time Calculation  Start Time: 1018  Stop Time: 1100  Time Calculation (min): 42 min    PT Evaluation Time Entry  PT Evaluation (Low) Time Entry: 37  PT Therapeutic Procedures Time Entry  Therapeutic Exercise Time Entry: 5                   Visit # 1  Assessment  PT Assessment Results: Decreased range of motion, Decreased strength, Pain  Rehab Prognosis: Good  Evaluation/Treatment Tolerance: Patient tolerated treatment well  Pt. Is a 77 y/o female with L knee pain. She does state she received and injection and feeling better L knee, but she has difficulty going up stairs. Pt. Is with weakness, her ROM is wnl and all special tests negative L knee. Pt. Would benefit from skilled PT to address the above deficits.     Plan  Treatment/Interventions: Cryotherapy, Hot pack, Education/ Instruction, Self care/ home management, Therapeutic exercises, Therapeutic activities, Manual therapy  PT Plan: Skilled PT  Rehab Potential: Good  Plan of Care Agreement: Patient       Current Problem  1. Leg weakness, bilateral        2. Primary osteoarthritis of left knee  Referral to Physical Therapy    Follow Up In Physical Therapy          Subjective  Pt. States her L knee and the top of her Rfoot is bothering her progressively in the last year. Pt. States about a year ago she moved and her new apartment may have cement under the balwinder.   Pt. Did receive a cortisone injection in her knee and it is feeling better, she does states stairs are difficulty, and she does cont. With knee pain L.   General:  General  Reason for Referral: intitial eval  Referred By: Dr. Manjeet MD  Preferred Learning Style: verbal  Precautions:  Precautions  STEADI Fall Risk Score (The score of 4 or more indicates an increased risk of falling): 2  Medical Precautions:  (pre DM, HTN, vascular disease, curvature of the spine.)       Pain:  Pain Assessment: 0-10  Pain Score:  2  Pain Location:  (L knee and top of L foot.)  Pain Orientation: Left  Home Living:  wnl   Prior Function Per Pt/Caregiver Report:  Retired  Cares for great grandchildren some days    Objective  Posture:    wnl       Range of Motion:    SHAISTA AROM knee wnl      Strength:    SHAISTA hip :   Flex: 4/5  Abd: 4+/5  Add 4+/5    Knees:   L 4+/5  R HS 4/5; quad 4+/5    Ankles wfl     Flexibility:    L calf: ~+10;   R calf wnl (PF / inv / ever :is with stretching but normal ROM)   Palpation:      Special Tests:  Slump test: L (-);  R (+)  Miguel L (+);  R (-)  Megan L  (-); R (-)       Gait:.Pt. amb. Indep with slight antalgia.         Other:  Visit 1: al 5-10-24 and POC education.     EXERCISES       Date       VISIT# # # # #    REPS REPS REPS REPS          Nustep               DF calf str              Shuttle       dlp       slp       hr              3 way kicks              Side amb              Step ups              Clam shells       Piriformis str.                                                                                            HEP                 Outcome Measures:   LEFS: 11    OP EDUCATION:  Outpatient Education  Individual(s) Educated: Patient  Education Provided: POC, Anatomy  Risk and Benefits Discussed with Patient/Caregiver/Other: yes  Patient/Caregiver Demonstrated Understanding: yes  Plan of Care Discussed and Agreed Upon: yes  Patient Response to Education: Patient/Caregiver Verbalized Understanding of Information    Goals:  Active       OA L knee; SHAISTA LE weakness       Pt. will c/o less than 2/10 L knee pain with household chores.        Start:  05/10/24    Expected End:  07/10/24            Pt. will increase SHAISTA LE strength to wnl to allow increased up and down the stairs wnl.        Start:  05/10/24    Expected End:  08/10/24            Pt. Will be indep with progressive HEP to cont. Progress made in PT.         Start:  05/10/24    Expected End:  08/10/24                   Current Participants as of  5/10/2024    Name Type Comments Contact Info    Cj Burroughs MD Referring Provider  [phone]    Signature pending    Michelle Hughes PT Physical Therapist  [phone]    Electronically signed by Michelle Hughes PT at 5/10/2024 9413 EDT

## 2024-05-10 NOTE — TELEPHONE ENCOUNTER
1) go to urgent care for headache as the weekend is coming    2) add her on next week to discuss headaches. Okay for virtual.

## 2024-05-10 NOTE — PROGRESS NOTES
Physical Therapy    Physical Therapy Evaluation    Patient Name: Deborah Boston  MRN: [id number]  Today's Date: 5/10/2024  Time Calculation  Start Time: 1018  Stop Time: 1100  Time Calculation (min): 42 min    PT Evaluation Time Entry  PT Evaluation (Low) Time Entry: 37  PT Therapeutic Procedures Time Entry  Therapeutic Exercise Time Entry: 5                   Visit # 1  Assessment  PT Assessment Results: Decreased range of motion, Decreased strength, Pain  Rehab Prognosis: Good  Evaluation/Treatment Tolerance: Patient tolerated treatment well  Pt. Is a 77 y/o female with L knee pain. She does state she received and injection and feeling better L knee, but she has difficulty going up stairs. Pt. Is with weakness, her ROM is wnl and all special tests negative L knee. Pt. Would benefit from skilled PT to address the above deficits.     Plan  Treatment/Interventions: Cryotherapy, Hot pack, Education/ Instruction, Self care/ home management, Therapeutic exercises, Therapeutic activities, Manual therapy  PT Plan: Skilled PT  Rehab Potential: Good  Plan of Care Agreement: Patient       Current Problem  1. Leg weakness, bilateral        2. Primary osteoarthritis of left knee  Referral to Physical Therapy    Follow Up In Physical Therapy          Subjective   Pt. States her L knee and the top of her Rfoot is bothering her progressively in the last year. Pt. States about a year ago she moved and her new apartment may have cement under the balwinder.   Pt. Did receive a cortisone injection in her knee and it is feeling better, she does states stairs are difficulty, and she does cont. With knee pain L.   General:  General  Reason for Referral: intitial eval  Referred By: Dr. Manjeet MD  Preferred Learning Style: verbal  Precautions:  Precautions  STEADI Fall Risk Score (The score of 4 or more indicates an increased risk of falling): 2  Medical Precautions:  (pre DM, HTN, vascular disease, curvature of the spine.)        Pain:  Pain Assessment: 0-10  Pain Score: 2  Pain Location:  (L knee and top of L foot.)  Pain Orientation: Left  Home Living:  wnl   Prior Function Per Pt/Caregiver Report:  Retired  Cares for great grandchildren some days    Objective   Posture:    wnl       Range of Motion:    SHAISTA AROM knee wnl      Strength:    SHAISTA hip :   Flex: 4/5  Abd: 4+/5  Add 4+/5    Knees:   L 4+/5  R HS 4/5; quad 4+/5    Ankles wfl     Flexibility:    L calf: ~+10;   R calf wnl (PF / inv / ever :is with stretching but normal ROM)   Palpation:      Special Tests:  Slump test: L (-);  R (+)  Miguel L (+);  R (-)  Megan L  (-); R (-)       Gait:.Pt. amb. Indep with slight antalgia.         Other:  Visit 1: Eval 5-10-24 and POC education.     EXERCISES       Date       VISIT# # # # #    REPS REPS REPS REPS          Nustep               DF calf str              Shuttle       dlp       slp       hr              3 way kicks              Side amb              Step ups              Clam shells       Piriformis str.                                                                                            HEP                 Outcome Measures:   LEFS: 11    OP EDUCATION:  Outpatient Education  Individual(s) Educated: Patient  Education Provided: POC, Anatomy  Risk and Benefits Discussed with Patient/Caregiver/Other: yes  Patient/Caregiver Demonstrated Understanding: yes  Plan of Care Discussed and Agreed Upon: yes  Patient Response to Education: Patient/Caregiver Verbalized Understanding of Information    Goals:  Active       OA L knee; SHAISTA LE weakness       Pt. will c/o less than 2/10 L knee pain with household chores.        Start:  05/10/24    Expected End:  07/10/24            Pt. will increase SHAISTA LE strength to wnl to allow increased up and down the stairs wnl.        Start:  05/10/24    Expected End:  08/10/24            Pt. Will be indep with progressive HEP to cont. Progress made in PT.         Start:  05/10/24    Expected End:  08/10/24

## 2024-05-10 NOTE — TELEPHONE ENCOUNTER
Returning patients call about results  Went to orthopedic and had an injection about a week or two ago and ended up having a reaction to the cortisone.      She also has a headache that she can;'t get rid of, is there anything you can suggest or send in that would work. She has take otc ibuprofen and tylenol and not working for the headache. Using [facility]

## 2024-05-10 NOTE — TELEPHONE ENCOUNTER
Pt notified, she will go to urgent care, she has had the headache since received the cortisone injection

## 2024-05-13 ENCOUNTER — CLINICAL SUPPORT (OUTPATIENT)
Dept: PRIMARY CARE | Facility: CLINIC | Age: 79
End: 2024-05-13
Payer: MEDICARE

## 2024-05-13 VITALS — OXYGEN SATURATION: 98 % | DIASTOLIC BLOOD PRESSURE: 82 MMHG | SYSTOLIC BLOOD PRESSURE: 124 MMHG | HEART RATE: 86 BPM

## 2024-05-13 DIAGNOSIS — I10 ESSENTIAL HYPERTENSION: ICD-10-CM

## 2024-05-13 PROCEDURE — 99211 OFF/OP EST MAY X REQ PHY/QHP: CPT | Performed by: FAMILY MEDICINE

## 2024-05-13 NOTE — Clinical Note
Currently taking losartan and metoprolol for hypertension, currently on doxycycline for sinus infection has a headache today. Bp 124/82 today

## 2024-05-20 ENCOUNTER — TREATMENT (OUTPATIENT)
Dept: PHYSICAL THERAPY | Facility: HOSPITAL | Age: 79
End: 2024-05-20
Payer: MEDICARE

## 2024-05-20 DIAGNOSIS — M17.12 PRIMARY OSTEOARTHRITIS OF LEFT KNEE: ICD-10-CM

## 2024-05-20 PROCEDURE — 97110 THERAPEUTIC EXERCISES: CPT | Mod: GP | Performed by: PHYSICAL THERAPIST

## 2024-05-20 ASSESSMENT — PAIN SCALES - GENERAL: PAINLEVEL_OUTOF10: 1

## 2024-05-20 ASSESSMENT — PAIN - FUNCTIONAL ASSESSMENT: PAIN_FUNCTIONAL_ASSESSMENT: 0-10

## 2024-05-20 NOTE — PROGRESS NOTES
"Physical Therapy    Physical Therapy Treatment    Patient Name: Deborah Boston  MRN: [id number]  Today's Date: 5/20/2024  Time Calculation  Start Time: 1515  Stop Time: 1600  Time Calculation (min): 45 min1945     PT Therapeutic Procedures Time Entry  Therapeutic Exercise Time Entry: 45           Visit: # 2      Assessment:  Pt. Tolerates all therEx with some lateral knee pain SHAISTA with standing exercises.          Plan:  Cont. Increase strength.      Current Problem  1. Primary osteoarthritis of left knee  Follow Up In Physical Therapy          Subjective   Pt. States she is with L knee hurting on and off.   She did have an ER visit recently d/t headache that she still has.         Pain  Pain Assessment: 0-10  Pain Score: 1  Pain Location: Knee  Pain Orientation: Left    Objective            Treatments:   Other:  Visit 1: Eval 5-10-24 and POC education.     EXERCISES       Date 5/20/24      VISIT# #2 # # #    REPS REPS REPS REPS          Nustep  L1 10'             DF calf str 30\"x3             Shuttle       dlp 4B 10x2      slp 3B 10x2 ea      hr              3 way kicks              Side amb 1/2 laps x 4             Step ups 5\" 10x1 ea             Clam shells 10x2 ea      Piriformis str.  30\"x3 ea                                                                                          HEP                      Goals:  Active       OA L knee; SHAISTA LE weakness       Pt. will c/o less than 2/10 L knee pain with household chores.        Start:  05/10/24    Expected End:  07/10/24            Pt. will increase SHAISTA LE strength to wnl to allow increased up and down the stairs wnl.        Start:  05/10/24    Expected End:  08/10/24            Pt. Will be indep with progressive HEP to cont. Progress made in PT.         Start:  05/10/24    Expected End:  08/10/24               "

## 2024-05-22 ENCOUNTER — APPOINTMENT (OUTPATIENT)
Dept: PHYSICAL THERAPY | Facility: HOSPITAL | Age: 79
End: 2024-05-22
Payer: MEDICARE

## 2024-05-22 ENCOUNTER — DOCUMENTATION (OUTPATIENT)
Dept: PHYSICAL THERAPY | Facility: HOSPITAL | Age: 79
End: 2024-05-22
Payer: MEDICARE

## 2024-05-22 NOTE — PROGRESS NOTES
Physical Therapy                 Therapy Communication Note    Patient Name: Deborah Boston  MRN: [id number]  Today's Date: 5/22/2024     Discipline: Physical Therapy    Missed Time: Cancel    Missed Visit Reason:  Pt cx 5/20/24 family in the ER

## 2024-05-28 ENCOUNTER — TREATMENT (OUTPATIENT)
Dept: PHYSICAL THERAPY | Facility: HOSPITAL | Age: 79
End: 2024-05-28
Payer: MEDICARE

## 2024-05-28 DIAGNOSIS — R29.898 LEG WEAKNESS, BILATERAL: Primary | ICD-10-CM

## 2024-05-28 DIAGNOSIS — M17.12 PRIMARY OSTEOARTHRITIS OF LEFT KNEE: ICD-10-CM

## 2024-05-28 PROCEDURE — 97110 THERAPEUTIC EXERCISES: CPT | Mod: GP,CQ

## 2024-05-28 ASSESSMENT — PAIN - FUNCTIONAL ASSESSMENT: PAIN_FUNCTIONAL_ASSESSMENT: 0-10

## 2024-05-28 ASSESSMENT — PAIN SCALES - GENERAL: PAINLEVEL_OUTOF10: 4

## 2024-05-28 NOTE — PROGRESS NOTES
"Physical Therapy    Physical Therapy Treatment    Patient Name: Deborah Boston  MRN: [id number]  Today's Date: 5/28/2024  Time Calculation  Start Time: 1115  Stop Time: 1155  Time Calculation (min): 40 min1945     PT Therapeutic Procedures Time Entry  Therapeutic Exercise Time Entry: 40           Visit: # 3      Assessment:  .  Patient able to complete new exercises without discomfort. No LOB during session. Pt noted no change in pain at end of session.         Plan:  Cont. Increase strength.      Current Problem  1. Leg weakness, bilateral        2. Primary osteoarthritis of left knee  Follow Up In Physical Therapy          Subjective   Pt noted she has had some increased dizziness since Friday, she noted she has been periodically checking BP and it is normal. She was rushing around this morning for exterminators to come spray so she is a little fatigued. She reported she will see her doctor for follow up for ER visit tomorrow.        Pain  Pain Assessment: 0-10  Pain Score: 4  Pain Location: Knee  Pain Orientation: Left    Objective   BP: 128/85 post NuStep     Added 3 way kicks and HR on shuttle    Treatments:   Other:  Visit 1: Eval 5-10-24 and POC education.     EXERCISES       Date 5/20/24 5/28/24     VISIT# #2 #3 # #    REPS REPS REPS REPS          Nustep  L1 10' L1 10'            DF calf str 30\"x3 30\" x3            Shuttle       dlp 4B 10x2 4B 10x2     slp 3B 10x2 ea 3B 10x2 ea     hr  3b 10x1            3 way kicks  10x2 ea            Side amb 1/2 laps x 4 2 laps            Step ups 5\" 10x1 ea 5\" 10x1 ea            Clam shells 10x2 ea 10x2 ea     Piriformis str.  30\"x3 ea 30\"x3 ea                                                                                         HEP                      Goals:  Active       OA L knee; SHAISTA LE weakness       Pt. will c/o less than 2/10 L knee pain with household chores.        Start:  05/10/24    Expected End:  07/10/24            Pt. will increase SHAISTA LE strength " to wnl to allow increased up and down the stairs wnl.        Start:  05/10/24    Expected End:  08/10/24            Pt. Will be indep with progressive HEP to cont. Progress made in PT.         Start:  05/10/24    Expected End:  08/10/24

## 2024-05-29 ENCOUNTER — OFFICE VISIT (OUTPATIENT)
Dept: PRIMARY CARE | Facility: CLINIC | Age: 79
End: 2024-05-29
Payer: MEDICARE

## 2024-05-29 ENCOUNTER — LAB (OUTPATIENT)
Dept: LAB | Facility: LAB | Age: 79
End: 2024-05-29
Payer: MEDICARE

## 2024-05-29 VITALS
WEIGHT: 121 LBS | HEIGHT: 61 IN | OXYGEN SATURATION: 97 % | RESPIRATION RATE: 16 BRPM | DIASTOLIC BLOOD PRESSURE: 69 MMHG | BODY MASS INDEX: 22.84 KG/M2 | SYSTOLIC BLOOD PRESSURE: 110 MMHG | HEART RATE: 63 BPM

## 2024-05-29 DIAGNOSIS — E78.1 ESSENTIAL HYPERTRIGLYCERIDEMIA: ICD-10-CM

## 2024-05-29 DIAGNOSIS — Z23 ENCOUNTER FOR IMMUNIZATION: ICD-10-CM

## 2024-05-29 DIAGNOSIS — D72.829 LEUKOCYTOSIS, UNSPECIFIED TYPE: ICD-10-CM

## 2024-05-29 DIAGNOSIS — E55.9 VITAMIN D DEFICIENCY: ICD-10-CM

## 2024-05-29 DIAGNOSIS — E78.2 MIXED HYPERLIPIDEMIA: ICD-10-CM

## 2024-05-29 DIAGNOSIS — E87.6 HYPOKALEMIA: ICD-10-CM

## 2024-05-29 DIAGNOSIS — M53.9 MULTILEVEL DEGENERATIVE DISC DISEASE: ICD-10-CM

## 2024-05-29 DIAGNOSIS — I73.9 PERIPHERAL VASCULAR DISEASE WITH CLAUDICATION (CMS-HCC): Primary | ICD-10-CM

## 2024-05-29 DIAGNOSIS — N32.81 OVERACTIVE BLADDER: ICD-10-CM

## 2024-05-29 DIAGNOSIS — M79.604 BILATERAL LEG PAIN: ICD-10-CM

## 2024-05-29 DIAGNOSIS — M19.90 ARTHRITIS: ICD-10-CM

## 2024-05-29 DIAGNOSIS — I10 ESSENTIAL HYPERTENSION: ICD-10-CM

## 2024-05-29 DIAGNOSIS — J30.9 ALLERGIC RHINITIS, UNSPECIFIED SEASONALITY, UNSPECIFIED TRIGGER: ICD-10-CM

## 2024-05-29 DIAGNOSIS — K58.2 IRRITABLE BOWEL SYNDROME WITH BOTH CONSTIPATION AND DIARRHEA: ICD-10-CM

## 2024-05-29 DIAGNOSIS — F41.9 ANXIETY: ICD-10-CM

## 2024-05-29 DIAGNOSIS — R35.0 URINARY FREQUENCY: ICD-10-CM

## 2024-05-29 DIAGNOSIS — M79.605 BILATERAL LEG PAIN: ICD-10-CM

## 2024-05-29 DIAGNOSIS — R73.03 PREDIABETES: ICD-10-CM

## 2024-05-29 PROBLEM — M25.562 ACUTE PAIN OF LEFT KNEE: Status: RESOLVED | Noted: 2023-11-16 | Resolved: 2024-05-29

## 2024-05-29 PROBLEM — R29.898 LEG WEAKNESS, BILATERAL: Status: RESOLVED | Noted: 2024-05-10 | Resolved: 2024-05-29

## 2024-05-29 PROBLEM — M71.22 SYNOVIAL CYST OF LEFT POPLITEAL SPACE: Status: RESOLVED | Noted: 2024-04-23 | Resolved: 2024-05-29

## 2024-05-29 LAB
ANION GAP SERPL CALC-SCNC: 11 MMOL/L (ref 10–20)
BASOPHILS # BLD AUTO: 0.04 X10*3/UL (ref 0–0.1)
BASOPHILS NFR BLD AUTO: 0.7 %
BUN SERPL-MCNC: 23 MG/DL (ref 6–23)
CALCIUM SERPL-MCNC: 9.5 MG/DL (ref 8.6–10.3)
CHLORIDE SERPL-SCNC: 97 MMOL/L (ref 98–107)
CO2 SERPL-SCNC: 32 MMOL/L (ref 21–32)
CREAT SERPL-MCNC: 0.66 MG/DL (ref 0.5–1.05)
EGFRCR SERPLBLD CKD-EPI 2021: 90 ML/MIN/1.73M*2
EOSINOPHIL # BLD AUTO: 0.16 X10*3/UL (ref 0–0.4)
EOSINOPHIL NFR BLD AUTO: 2.8 %
ERYTHROCYTE [DISTWIDTH] IN BLOOD BY AUTOMATED COUNT: 12.2 % (ref 11.5–14.5)
GLUCOSE SERPL-MCNC: 83 MG/DL (ref 74–99)
HCT VFR BLD AUTO: 39.7 % (ref 36–46)
HGB BLD-MCNC: 14 G/DL (ref 12–16)
IMM GRANULOCYTES # BLD AUTO: 0.02 X10*3/UL (ref 0–0.5)
IMM GRANULOCYTES NFR BLD AUTO: 0.4 % (ref 0–0.9)
LYMPHOCYTES # BLD AUTO: 2.27 X10*3/UL (ref 0.8–3)
LYMPHOCYTES NFR BLD AUTO: 39.8 %
MCH RBC QN AUTO: 31.7 PG (ref 26–34)
MCHC RBC AUTO-ENTMCNC: 35.3 G/DL (ref 32–36)
MCV RBC AUTO: 90 FL (ref 80–100)
MONOCYTES # BLD AUTO: 0.48 X10*3/UL (ref 0.05–0.8)
MONOCYTES NFR BLD AUTO: 8.4 %
NEUTROPHILS # BLD AUTO: 2.74 X10*3/UL (ref 1.6–5.5)
NEUTROPHILS NFR BLD AUTO: 47.9 %
NRBC BLD-RTO: 0 /100 WBCS (ref 0–0)
PLATELET # BLD AUTO: 189 X10*3/UL (ref 150–450)
POC APPEARANCE, URINE: CLEAR
POC BILIRUBIN, URINE: NEGATIVE
POC BLOOD, URINE: NEGATIVE
POC COLOR, URINE: YELLOW
POC FINGERSTICK BLOOD GLUCOSE: 108 MG/DL (ref 70–100)
POC GLUCOSE, URINE: NEGATIVE MG/DL
POC HEMOGLOBIN A1C: 5.9 % (ref 4.2–6.5)
POC KETONES, URINE: NEGATIVE MG/DL
POC LEUKOCYTES, URINE: ABNORMAL
POC NITRITE,URINE: NEGATIVE
POC PH, URINE: 7.5 PH
POC PROTEIN, URINE: NEGATIVE MG/DL
POC SPECIFIC GRAVITY, URINE: 1.01
POC UROBILINOGEN, URINE: 0.2 EU/DL
POTASSIUM SERPL-SCNC: 3.7 MMOL/L (ref 3.5–5.3)
RBC # BLD AUTO: 4.41 X10*6/UL (ref 4–5.2)
SODIUM SERPL-SCNC: 136 MMOL/L (ref 136–145)
WBC # BLD AUTO: 5.7 X10*3/UL (ref 4.4–11.3)

## 2024-05-29 PROCEDURE — 36415 COLL VENOUS BLD VENIPUNCTURE: CPT

## 2024-05-29 PROCEDURE — 99214 OFFICE O/P EST MOD 30 MIN: CPT | Performed by: FAMILY MEDICINE

## 2024-05-29 PROCEDURE — 83036 HEMOGLOBIN GLYCOSYLATED A1C: CPT | Performed by: FAMILY MEDICINE

## 2024-05-29 PROCEDURE — 1036F TOBACCO NON-USER: CPT | Performed by: FAMILY MEDICINE

## 2024-05-29 PROCEDURE — 1157F ADVNC CARE PLAN IN RCRD: CPT | Performed by: FAMILY MEDICINE

## 2024-05-29 PROCEDURE — 3078F DIAST BP <80 MM HG: CPT | Performed by: FAMILY MEDICINE

## 2024-05-29 PROCEDURE — 1160F RVW MEDS BY RX/DR IN RCRD: CPT | Performed by: FAMILY MEDICINE

## 2024-05-29 PROCEDURE — 1159F MED LIST DOCD IN RCRD: CPT | Performed by: FAMILY MEDICINE

## 2024-05-29 PROCEDURE — 80048 BASIC METABOLIC PNL TOTAL CA: CPT

## 2024-05-29 PROCEDURE — 81003 URINALYSIS AUTO W/O SCOPE: CPT | Performed by: FAMILY MEDICINE

## 2024-05-29 PROCEDURE — 82962 GLUCOSE BLOOD TEST: CPT | Performed by: FAMILY MEDICINE

## 2024-05-29 PROCEDURE — 1123F ACP DISCUSS/DSCN MKR DOCD: CPT | Performed by: FAMILY MEDICINE

## 2024-05-29 PROCEDURE — 3074F SYST BP LT 130 MM HG: CPT | Performed by: FAMILY MEDICINE

## 2024-05-29 PROCEDURE — 87086 URINE CULTURE/COLONY COUNT: CPT

## 2024-05-29 PROCEDURE — 85025 COMPLETE CBC W/AUTO DIFF WBC: CPT

## 2024-05-29 RX ORDER — OXYBUTYNIN CHLORIDE 5 MG/1
5 TABLET, EXTENDED RELEASE ORAL DAILY
Qty: 100 TABLET | Refills: 3 | Status: SHIPPED | OUTPATIENT
Start: 2024-05-29 | End: 2025-07-03

## 2024-05-29 RX ORDER — LOSARTAN POTASSIUM 50 MG/1
50 TABLET ORAL 2 TIMES DAILY
Qty: 200 TABLET | Refills: 3 | Status: SHIPPED | OUTPATIENT
Start: 2024-05-29 | End: 2025-07-03

## 2024-05-29 RX ORDER — ATORVASTATIN CALCIUM 40 MG/1
40 TABLET, FILM COATED ORAL DAILY
Qty: 100 TABLET | Refills: 3 | Status: SHIPPED | OUTPATIENT
Start: 2024-05-29 | End: 2025-07-03

## 2024-05-29 RX ORDER — CHLORTHALIDONE 25 MG/1
25 TABLET ORAL DAILY
Qty: 100 TABLET | Refills: 3 | Status: SHIPPED | OUTPATIENT
Start: 2024-05-29 | End: 2025-07-03

## 2024-05-29 ASSESSMENT — ENCOUNTER SYMPTOMS
POLYDIPSIA: 0
ABDOMINAL PAIN: 0
SINUS PAIN: 0
NAUSEA: 0
FEVER: 0
POLYPHAGIA: 0
COUGH: 0
CONSTIPATION: 0
DYSPHORIC MOOD: 0
NUMBNESS: 0
DYSURIA: 0
ADENOPATHY: 0
CONFUSION: 0
VOMITING: 0
CHILLS: 0
CHEST TIGHTNESS: 0
LIGHT-HEADEDNESS: 0
SHORTNESS OF BREATH: 0
WHEEZING: 0
HEMATURIA: 0
NERVOUS/ANXIOUS: 0
DIARRHEA: 0
SINUS PRESSURE: 0
FREQUENCY: 0
HEADACHES: 0
PALPITATIONS: 0
SORE THROAT: 0
UNEXPECTED WEIGHT CHANGE: 0
DIAPHORESIS: 0
DIZZINESS: 0

## 2024-05-29 NOTE — ASSESSMENT & PLAN NOTE
- white blood cell count 16.7 in recent ED trip. Had just gotten cortisone injection   - recheck CBC

## 2024-05-29 NOTE — PATIENT INSTRUCTIONS
Deborah Boston ,    Thank you for coming in today. We at [facility] appreciate your trust in our care. If you have any questions or concerns about the care you received today, please do not hesitate to contact us at [phone].    The following instructions were discussed today:    - get labs now to check potassium and white blood cell count   - I recommend the following vaccines at the pharmacy: RSV  - Follow up in 6 months   - Please get blood work done 1-2 weeks prior to your next visit. For blood work: Nothing to eat or drink for at least 10 hours prior. Okay for water or black coffee.

## 2024-05-29 NOTE — ASSESSMENT & PLAN NOTE
-continue acetaminophen as needed   Temp-102.5, giving motrin, giving water, but did not  Eat,voiding frequently,tired, no swollen glands,advised to give fluids, dress light, bath for temp, motrin, mom to moniter for other symptoms, call back if occurs

## 2024-05-29 NOTE — ASSESSMENT & PLAN NOTE
- current regimen: on chlorthalidone 25 mg daily, losartan 50 mg daily, metoprolol XL 50 mg daily  - controlled

## 2024-05-29 NOTE — ASSESSMENT & PLAN NOTE
- likely secondary to overactive bladder  - UA today with trace LE. Will send for culture  - continue oxybutynin

## 2024-05-29 NOTE — PROGRESS NOTES
Subjective   Patient ID: Deborah Boston is a 78 y.o. female who presents for ER follow up for hypertention (Dehydration was the dx 5/10/24 /On and off headache since before she went to the ER.  (Dry mouth, headache and neck pain)/Urinating a lot, will check glucose number in office ).    HPI   routine follow up. chronic issues as per assessment and plan.     Was in ED on 5/10/24 for headache after cortisone shot. Also assoicated with nasal congestion. She was found to have potassium of 3.3 which was replaced. Did have elevated white blood cell count (had just received cortisone injection). Was given doxycycline for presumed sinusitis. CT head was negative. She states her headache has improved.     Office Visit on 05/29/2024   Component Date Value Ref Range Status    POC Fingerstick Blood Glucose 05/29/2024 108 (A)  70 - 100 mg/dl Final    POC Color, Urine 05/29/2024 Yellow  Straw, Yellow, Light-Yellow Final    POC Appearance, Urine 05/29/2024 Clear  Clear Final    POC Glucose, Urine 05/29/2024 NEGATIVE  NEGATIVE mg/dl Final    POC Bilirubin, Urine 05/29/2024 NEGATIVE  NEGATIVE Final    POC Ketones, Urine 05/29/2024 NEGATIVE  NEGATIVE mg/dl Final    POC Specific Gravity, Urine 05/29/2024 1.015  1.005 - 1.035 Final    POC Blood, Urine 05/29/2024 NEGATIVE  NEGATIVE Final    POC PH, Urine 05/29/2024 7.5  No Reference Range Established PH Final    POC Protein, Urine 05/29/2024 NEGATIVE  NEGATIVE, 30 (1+) mg/dl Final    POC Urobilinogen, Urine 05/29/2024 0.2  0.2, 1.0 EU/DL Final    Poc Nitrite, Urine 05/29/2024 NEGATIVE  NEGATIVE Final    POC Leukocytes, Urine 05/29/2024 TRACE (A)  NEGATIVE Final    POC HEMOGLOBIN A1c 05/29/2024 5.9  4.2 - 6.5 % Final        Review of Systems   Constitutional:  Negative for chills, diaphoresis, fever and unexpected weight change.   HENT:  Negative for congestion, sinus pressure, sinus pain, sneezing and sore throat.    Respiratory:  Negative for cough, chest tightness, shortness of  "breath and wheezing.    Cardiovascular:  Negative for chest pain, palpitations and leg swelling.   Gastrointestinal:  Negative for abdominal pain, constipation, diarrhea, nausea and vomiting.   Endocrine: Negative for cold intolerance, heat intolerance, polydipsia, polyphagia and polyuria.   Genitourinary:  Negative for dysuria, frequency, hematuria and urgency.   Neurological:  Negative for dizziness, syncope, light-headedness, numbness and headaches.   Hematological:  Negative for adenopathy.   Psychiatric/Behavioral:  Negative for confusion and dysphoric mood. The patient is not nervous/anxious.        Objective   /69 (BP Location: Right arm, Patient Position: Sitting, BP Cuff Size: Large adult long)   Pulse 63   Resp 16   Ht 1.549 m (5' 1\")   Wt 54.9 kg (121 lb)   SpO2 97%   BMI 22.86 kg/m²     Physical Exam  Vitals and nursing note reviewed.   Constitutional:       General: She is not in acute distress.     Appearance: Normal appearance.   HENT:      Head: Normocephalic and atraumatic.      Nose: Nose normal.   Eyes:      Extraocular Movements: Extraocular movements intact.      Conjunctiva/sclera: Conjunctivae normal.      Pupils: Pupils are equal, round, and reactive to light.   Cardiovascular:      Rate and Rhythm: Normal rate and regular rhythm.      Heart sounds: No murmur heard.     No friction rub. No gallop.   Pulmonary:      Effort: Pulmonary effort is normal.      Breath sounds: Normal breath sounds. No wheezing, rhonchi or rales.   Abdominal:      General: Bowel sounds are normal. There is no distension.      Palpations: Abdomen is soft.      Tenderness: There is no abdominal tenderness.   Musculoskeletal:         General: Normal range of motion.      Cervical back: Normal range of motion and neck supple.   Skin:     General: Skin is warm and dry.   Neurological:      General: No focal deficit present.      Mental Status: She is alert and oriented to person, place, and time.      Deep " Tendon Reflexes: Reflexes normal.   Psychiatric:         Mood and Affect: Mood normal.         Behavior: Behavior normal.         Thought Content: Thought content normal.         Judgment: Judgment normal.         Assessment/Plan   Problem List Items Addressed This Visit             ICD-10-CM    Allergic rhinitis J30.9     - controlled. Continue flonase and loratadine          Relevant Orders    Vitamin B12    CBC and Auto Differential    Comprehensive Metabolic Panel    TSH with reflex to Free T4 if abnormal    Anxiety F41.9     -feels it is manageable at this point without medication. Will continue to monitor         Relevant Orders    Vitamin B12    CBC and Auto Differential    Comprehensive Metabolic Panel    TSH with reflex to Free T4 if abnormal    Arthritis M19.90     -continue acetaminophen as needed.         Relevant Orders    Vitamin B12    CBC and Auto Differential    Comprehensive Metabolic Panel    TSH with reflex to Free T4 if abnormal    Bilateral leg pain M79.604, M79.605     - negative ABIs         Relevant Orders    Vitamin B12    CBC and Auto Differential    Comprehensive Metabolic Panel    TSH with reflex to Free T4 if abnormal    Encounter for immunization Z23     - recommended the following vaccines at the pharmacy: RSV         Relevant Orders    Vitamin B12    CBC and Auto Differential    Comprehensive Metabolic Panel    TSH with reflex to Free T4 if abnormal    Essential hypertension I10     - current regimen: on chlorthalidone 25 mg daily, losartan 50 mg daily, metoprolol XL 50 mg daily  - controlled            Relevant Medications    chlorthalidone (Hygroton) 25 mg tablet    losartan (Cozaar) 50 mg tablet    Other Relevant Orders    Vitamin B12    CBC and Auto Differential    Comprehensive Metabolic Panel    TSH with reflex to Free T4 if abnormal    Essential hypertriglyceridemia E78.1     -continue atorvastatin           Relevant Orders    Vitamin B12    CBC and Auto Differential     Comprehensive Metabolic Panel    Lipid Panel    TSH with reflex to Free T4 if abnormal    Hypokalemia E87.6     - potassium 3.3 in ED  - recheck potassium         Relevant Orders    Basic metabolic panel    Vitamin B12    CBC and Auto Differential    Comprehensive Metabolic Panel    TSH with reflex to Free T4 if abnormal    Irritable bowel syndrome with both constipation and diarrhea K58.2     -continue miralax as needed          Relevant Orders    Vitamin B12    CBC and Auto Differential    Comprehensive Metabolic Panel    TSH with reflex to Free T4 if abnormal    Leukocytosis D72.829     - white blood cell count 16.7 in recent ED trip. Had just gotten cortisone injection   - recheck CBC         Relevant Orders    CBC and Auto Differential    Vitamin B12    CBC and Auto Differential    Comprehensive Metabolic Panel    TSH with reflex to Free T4 if abnormal    Mixed hyperlipidemia E78.2     -continue atorvastatin           Relevant Medications    atorvastatin (Lipitor) 40 mg tablet    Other Relevant Orders    Vitamin B12    CBC and Auto Differential    Comprehensive Metabolic Panel    Lipid Panel    TSH with reflex to Free T4 if abnormal    Multilevel degenerative disc disease M53.9     -continue acetaminophen as needed         Relevant Orders    Vitamin B12    CBC and Auto Differential    Comprehensive Metabolic Panel    TSH with reflex to Free T4 if abnormal    Overactive bladder N32.81     - controlled. Continue oxybutynin          Relevant Medications    oxybutynin XL (Ditropan XL) 5 mg 24 hr tablet    Other Relevant Orders    Vitamin B12    CBC and Auto Differential    Comprehensive Metabolic Panel    TSH with reflex to Free T4 if abnormal    Peripheral vascular disease with claudication (CMS-HCC) - Primary I73.9     - continue daily aspirin, statin  - recent ABIs negative            Relevant Orders    Vitamin B12    CBC and Auto Differential    Comprehensive Metabolic Panel    TSH with reflex to Free T4 if  abnormal    Prediabetes R73.03     - Encouraged healthy lifestyle, including adequate exercise and high fiber, low fat and low carb diet.          Relevant Orders    Vitamin B12    Hemoglobin A1C    CBC and Auto Differential    Comprehensive Metabolic Panel    TSH with reflex to Free T4 if abnormal    POCT glycosylated hemoglobin (Hb A1C) manually resulted (Completed)    Urinary frequency R35.0     - likely secondary to overactive bladder  - UA today with trace LE. Will send for culture  - continue oxybutynin         Relevant Orders    POCT fingerstick glucose manually resulted (Completed)    POCT UA Automated manually resulted (Completed)    POCT glycosylated hemoglobin (Hb A1C) manually resulted (Completed)    Urine Culture    Vitamin D deficiency E55.9    Relevant Orders    Vitamin D 25-Hydroxy,Total (for eval of Vitamin D levels)    Vitamin B12    CBC and Auto Differential    Comprehensive Metabolic Panel    TSH with reflex to Free T4 if abnormal

## 2024-05-31 ENCOUNTER — TREATMENT (OUTPATIENT)
Dept: PHYSICAL THERAPY | Facility: HOSPITAL | Age: 79
End: 2024-05-31
Payer: MEDICARE

## 2024-05-31 ENCOUNTER — APPOINTMENT (OUTPATIENT)
Dept: PHYSICAL THERAPY | Facility: HOSPITAL | Age: 79
End: 2024-05-31
Payer: MEDICARE

## 2024-05-31 DIAGNOSIS — M17.12 PRIMARY OSTEOARTHRITIS OF LEFT KNEE: ICD-10-CM

## 2024-05-31 LAB — BACTERIA UR CULT: NORMAL

## 2024-05-31 PROCEDURE — 97110 THERAPEUTIC EXERCISES: CPT | Mod: GP | Performed by: PHYSICAL THERAPIST

## 2024-05-31 ASSESSMENT — PAIN - FUNCTIONAL ASSESSMENT: PAIN_FUNCTIONAL_ASSESSMENT: 0-10

## 2024-05-31 ASSESSMENT — PAIN SCALES - GENERAL: PAINLEVEL_OUTOF10: 0 - NO PAIN

## 2024-05-31 NOTE — PROGRESS NOTES
"Physical Therapy    Physical Therapy Treatment    Patient Name: Deborah Boston  MRN: [id number]  Today's Date: 5/31/2024  Time Calculation  Start Time: 1030  Stop Time: 1115  Time Calculation (min): 45 min1945     PT Therapeutic Procedures Time Entry  Therapeutic Exercise Time Entry: 45           Visit: # 4      Assessment:  .  Patient able to complete new exercises without increased discomfort.          Plan:  Cont. Increase strength.      Current Problem  1. Primary osteoarthritis of left knee  Follow Up In Physical Therapy          Subjective   Pt states she had a follow up with her Dr. Recently. Pt. States she cont. With HA at times. Her L knee is feeling better.        Pain  Pain Assessment: 0-10  Pain Score: 0 - No pain  Pain Location: Knee  Pain Orientation: Left    Objective     Increased reps       Treatments:   Other:  Visit 1: Eval 5-10-24 and POC education.     EXERCISES       Date 5/20/24 5/28/24 5/31/24    VISIT# #2 #3 #4 #    REPS REPS REPS REPS          Nustep  L1 10' L1 10' L2 10'           DF calf str 30\"x3 30\" x3 30\"x3           Shuttle       dlp 4B 10x2 4B 10x2 5b 15x2    slp 3B 10x2 ea 3B 10x2 ea 4b 10x2ea    hr  3b 10x1            3 way kicks  10x2 ea 15x2           Side amb 1/2 laps x 4 2 laps 3 laps           Step ups 5\" 10x1 ea 5\" 10x1 ea 5\" 24x1 ea           Clam shells 10x2 ea 10x2 ea 15x2 ea    Piriformis str.  30\"x3 ea 30\"x3 ea 30\"x3 ea                                                                                        HEP                      Goals:  Active       OA L knee; SHAISTA LE weakness       Pt. will c/o less than 2/10 L knee pain with household chores.        Start:  05/10/24    Expected End:  07/10/24            Pt. will increase SHAISTA LE strength to wnl to allow increased up and down the stairs wnl.        Start:  05/10/24    Expected End:  08/10/24            Pt. Will be indep with progressive HEP to cont. Progress made in PT.         Start:  05/10/24    Expected End:  " 08/10/24

## 2024-06-03 ENCOUNTER — DOCUMENTATION (OUTPATIENT)
Dept: PHYSICAL THERAPY | Facility: HOSPITAL | Age: 79
End: 2024-06-03

## 2024-06-03 NOTE — PROGRESS NOTES
Physical Therapy                 Therapy Communication Note    Patient Name: Deborah Boston  MRN: [id number]  Today's Date: 6/3/2024     Discipline: Physical Therapy    Missed Visit Reason:      Missed Time: No Show    Comment: called, pt states she forgot appt

## 2024-06-05 ENCOUNTER — APPOINTMENT (OUTPATIENT)
Dept: PHYSICAL THERAPY | Facility: HOSPITAL | Age: 79
End: 2024-06-05
Payer: MEDICARE

## 2024-06-07 ENCOUNTER — TREATMENT (OUTPATIENT)
Dept: PHYSICAL THERAPY | Facility: HOSPITAL | Age: 79
End: 2024-06-07
Payer: MEDICARE

## 2024-06-07 DIAGNOSIS — M17.12 PRIMARY OSTEOARTHRITIS OF LEFT KNEE: ICD-10-CM

## 2024-06-07 DIAGNOSIS — R29.898 LEG WEAKNESS, BILATERAL: Primary | ICD-10-CM

## 2024-06-07 PROCEDURE — 97110 THERAPEUTIC EXERCISES: CPT | Mod: GP,CQ

## 2024-06-07 ASSESSMENT — PAIN - FUNCTIONAL ASSESSMENT: PAIN_FUNCTIONAL_ASSESSMENT: 0-10

## 2024-06-07 ASSESSMENT — PAIN SCALES - GENERAL: PAINLEVEL_OUTOF10: 3

## 2024-06-07 NOTE — PROGRESS NOTES
"Physical Therapy    Physical Therapy Treatment    Patient Name: Deborah Boston  MRN: [id number]  Today's Date: 6/7/2024  Time Calculation  Start Time: 1048  Stop Time: 1130  Time Calculation (min): 42 min    PT Therapeutic Procedures Time Entry  Therapeutic Exercise Time Entry: 42          VISIT:# 5    Current Problem  1. Leg weakness, bilateral        2. Primary osteoarthritis of left knee  Follow Up In Physical Therapy          Subjective   Pt reports she has some pain in the top of her foot.  Her knee is sore.  Pt reports she is trying to walk around daily     Precautions  Precautions  STEADI Fall Risk Score (The score of 4 or more indicates an increased risk of falling): 2  Medical Precautions:  (pre DM, HTN, vascular disease, curvature of the spine.)  Precautions Comment: none       Pain  Pain Assessment: 0-10  Pain Score: 3  Pain Location: Knee  Pain Orientation: Left       Objective    Access Code: [email or web address]  URL: [email or web address]  Date: 06/07/2024  Prepared by: Ubaldo Yates    Exercises  - Standing Hip Abduction with Counter Support  - 1-2 x daily - 5-7 x weekly - 3 sets - 10 reps  - Standing Hip Extension with Counter Support  - 1-2 x daily - 5-7 x weekly - 3 sets - 10 reps  - Standing March with Counter Support  - 1-2 x daily - 5-7 x weekly - 3 sets - 10 reps  - Heel Raises with Counter Support  - 1-2 x daily - 5-7 x weekly - 3 sets - 10 reps  - Standing Knee Flexion  - 1-2 x daily - 5-7 x weekly - 3 sets - 10 reps        Treatments:  Visit 1: Eval 5-10-24 and POC education.     EXERCISES        Date 5/20/24 5/28/24 5/31/24 6/7/2024     VISIT# #2 #3 #4 #5     REPS REPS REPS REPS            Nustep  L1 10' L1 10' L2 10' 10' @L3            DF calf str 30\"x3 30\" x3 30\"x3 30\" x 3            Shuttle        dlp 4B 10x2 4B 10x2 5b 15x2     slp 3B 10x2 ea 3B 10x2 ea 4b 10x2ea     hr  3b 10x1              3 way kicks  10x2 ea 15x2 2 x 10 RTB HEP   HS curls    2 x 10   HEP   HR     " "HEP   Side amb 1/2 laps x 4 2 laps 3 laps 2 laps            Step ups 5\" 10x1 ea 5\" 10x1 ea 5\" 24x1 ea 5\" 2 x 10             Clam shells 10x2 ea 10x2 ea 15x2 ea     Piriformis str.  30\"x3 ea 30\"x3 ea 30\"x3 ea 30\" x 3        PF R stretch                                     HEP    Demo/issued        Assessment: Pt understands new HEP with tband      Outpatient Education  Individual(s) Educated: Patient  Education Provided: Home Exercise Program  Equipment: Thera-Band  Risk and Benefits Discussed with Patient/Caregiver/Other: yes  Patient/Caregiver Demonstrated Understanding: yes  Plan of Care Discussed and Agreed Upon: yes  Patient Response to Education: Patient/Caregiver Verbalized Understanding of Information, Patient/Caregiver Performed Return Demonstration of Exercises/Activities, Patient/Caregiver Asked Appropriate Questions  Education Comment: Access Code: [email or web address]    Plan:  Review HEP as needed.  Cont to improve strength  OP PT Plan  Treatment/Interventions: Cryotherapy, Hot pack, Education/ Instruction, Self care/ home management, Therapeutic exercises, Therapeutic activities, Manual therapy  PT Plan: Skilled PT    Goals:  Active       OA L knee; SHAISTA LE weakness       Pt. will c/o less than 2/10 L knee pain with household chores.        Start:  05/10/24    Expected End:  07/10/24            Pt. will increase SHAISTA LE strength to wnl to allow increased up and down the stairs wnl.        Start:  05/10/24    Expected End:  08/10/24            Pt. Will be indep with progressive HEP to cont. Progress made in PT.         Start:  05/10/24    Expected End:  08/10/24               "

## 2024-06-09 DIAGNOSIS — I10 ESSENTIAL HYPERTENSION, BENIGN: ICD-10-CM

## 2024-06-10 RX ORDER — METOPROLOL SUCCINATE 50 MG/1
50 TABLET, EXTENDED RELEASE ORAL DAILY
Qty: 100 TABLET | Refills: 2 | OUTPATIENT
Start: 2024-06-10

## 2024-06-11 ENCOUNTER — TREATMENT (OUTPATIENT)
Dept: PHYSICAL THERAPY | Facility: HOSPITAL | Age: 79
End: 2024-06-11
Payer: MEDICARE

## 2024-06-11 DIAGNOSIS — M17.12 PRIMARY OSTEOARTHRITIS OF LEFT KNEE: ICD-10-CM

## 2024-06-11 PROCEDURE — 97110 THERAPEUTIC EXERCISES: CPT | Mod: GP | Performed by: PHYSICAL THERAPIST

## 2024-06-11 ASSESSMENT — PAIN - FUNCTIONAL ASSESSMENT: PAIN_FUNCTIONAL_ASSESSMENT: 0-10

## 2024-06-11 ASSESSMENT — PAIN SCALES - GENERAL: PAINLEVEL_OUTOF10: 0 - NO PAIN

## 2024-06-11 NOTE — PROGRESS NOTES
"Physical Therapy    Physical Therapy Treatment    Patient Name: Deborah Boston  MRN: [id number]  Today's Date: 6/11/2024  Time Calculation  Start Time: 1126  Stop Time: 1209  Time Calculation (min): 43 min    PT Therapeutic Procedures Time Entry  Therapeutic Exercise Time Entry: 43          VISIT:# 6    Current Problem  1. Primary osteoarthritis of left knee  Follow Up In Physical Therapy          Subjective   Pt reports she has some pain in the top of her foot.  Her knee is sore at times L. She states she is doing much better with her knee. She will reach out again to her Dr. D/t cont. HA, and even some dizziness at times. She has been monitoring her BP and it seems okay.      Precautions  Precautions  STEADI Fall Risk Score (The score of 4 or more indicates an increased risk of falling): no change  Medical Precautions:  (pre DM, HTN, vascular disease, curvature of the spine.)       Pain  Pain Assessment: 0-10  Pain Score: 0 - No pain  Pain Location: Knee  Pain Orientation: Left       Objective    Added tband for side amb.     Treatments:  Visit 1: Eval 5-10-24 and POC education.     EXERCISES        Date 5/20/24 5/28/24 5/31/24 6/7/24 6/11/24   VISIT# #2 #3 #4 #5 6    REPS REPS REPS REPS            Nustep  L1 10' L1 10' L2 10' 10' @L3 10' L3           DF calf str 30\"x3 30\" x3 30\"x3 30\" x 3 30\"x3           Shuttle        dlp 4B 10x2 4B 10x2 5b 15x2     slp 3B 10x2 ea 3B 10x2 ea 4b 10x2ea     hr  3b 10x1              3 way kicks  10x2 ea 15x2 2 x 10 RTB HEP   HS curls    2 x 10   HEP   HR     HEP   Side amb 1/2 laps x 4 2 laps 3 laps 2 laps 4 -1/2 laps ytb            Step ups 5\" 10x1 ea 5\" 10x1 ea 5\" 24x1 ea 5\" 2 x 10  5\" 15x2 ea           Clam shells 10x2 ea 10x2 ea 15x2 ea     Piriformis str.  30\"x3 ea 30\"x3 ea 30\"x3 ea 30\" x 3        PF R stretch  30\"x3                                   HEP    Demo/issued        Assessment:   Pt. Without increase in pain with all exercises and really feels the stretch with PF R " foot.         Plan:  Review HEP as needed.  Cont to improve strength       Goals:  Active       OA L knee; SHAISTA LE weakness       Pt. will c/o less than 2/10 L knee pain with household chores.        Start:  05/10/24    Expected End:  07/10/24            Pt. will increase SHAISTA LE strength to wnl to allow increased up and down the stairs wnl.        Start:  05/10/24    Expected End:  08/10/24            Pt. Will be indep with progressive HEP to cont. Progress made in PT.         Start:  05/10/24    Expected End:  08/10/24

## 2024-06-13 ENCOUNTER — DOCUMENTATION (OUTPATIENT)
Dept: PHYSICAL THERAPY | Facility: HOSPITAL | Age: 79
End: 2024-06-13
Payer: MEDICARE

## 2024-06-13 NOTE — PROGRESS NOTES
Physical Therapy    Discharge Summary    Name: Deborah Boston  MRN: [id number]  : 1945  Date: 24    Discharge Summary: PT    Pt. Missed last appt. D/t wrong time. Pt. Is fine to be discharged and will cont. Indep HEP.     Rehab Discharge Reason: Achieved all and/or the most significant goals(s)

## 2024-08-23 ENCOUNTER — TELEMEDICINE (OUTPATIENT)
Dept: PRIMARY CARE | Facility: CLINIC | Age: 79
End: 2024-08-23
Payer: MEDICARE

## 2024-08-23 DIAGNOSIS — J01.90 ACUTE SINUSITIS, RECURRENCE NOT SPECIFIED, UNSPECIFIED LOCATION: ICD-10-CM

## 2024-08-23 DIAGNOSIS — J30.9 ALLERGIC RHINITIS, UNSPECIFIED SEASONALITY, UNSPECIFIED TRIGGER: Primary | ICD-10-CM

## 2024-08-23 PROCEDURE — 1123F ACP DISCUSS/DSCN MKR DOCD: CPT | Performed by: FAMILY MEDICINE

## 2024-08-23 PROCEDURE — 1159F MED LIST DOCD IN RCRD: CPT | Performed by: FAMILY MEDICINE

## 2024-08-23 PROCEDURE — 99442 PR PHYS/QHP TELEPHONE EVALUATION 11-20 MIN: CPT | Performed by: FAMILY MEDICINE

## 2024-08-23 PROCEDURE — 1036F TOBACCO NON-USER: CPT | Performed by: FAMILY MEDICINE

## 2024-08-23 PROCEDURE — 1157F ADVNC CARE PLAN IN RCRD: CPT | Performed by: FAMILY MEDICINE

## 2024-08-23 PROCEDURE — 1160F RVW MEDS BY RX/DR IN RCRD: CPT | Performed by: FAMILY MEDICINE

## 2024-08-23 RX ORDER — BENZONATATE 200 MG/1
200 CAPSULE ORAL 3 TIMES DAILY PRN
Qty: 42 CAPSULE | Refills: 0 | Status: SHIPPED | OUTPATIENT
Start: 2024-08-23 | End: 2024-09-22

## 2024-08-23 RX ORDER — LORATADINE 10 MG/1
10 TABLET ORAL DAILY
Qty: 30 TABLET | Refills: 5 | Status: SHIPPED | OUTPATIENT
Start: 2024-08-23 | End: 2025-02-19

## 2024-08-23 RX ORDER — AZITHROMYCIN 250 MG/1
TABLET, FILM COATED ORAL
Qty: 6 TABLET | Refills: 0 | Status: SHIPPED | OUTPATIENT
Start: 2024-08-23 | End: 2024-08-27

## 2024-08-23 RX ORDER — FLUTICASONE PROPIONATE 50 MCG
1 SPRAY, SUSPENSION (ML) NASAL DAILY
Qty: 48 G | Refills: 1 | Status: SHIPPED | OUTPATIENT
Start: 2024-08-23 | End: 2025-02-19

## 2024-08-23 RX ORDER — MONTELUKAST SODIUM 10 MG/1
10 TABLET ORAL NIGHTLY
Qty: 30 TABLET | Refills: 5 | Status: SHIPPED | OUTPATIENT
Start: 2024-08-23 | End: 2025-02-19

## 2024-08-23 ASSESSMENT — ENCOUNTER SYMPTOMS
HEMATURIA: 0
SORE THROAT: 0
COUGH: 1
RHINORRHEA: 1
DIAPHORESIS: 0
CHILLS: 1
CONFUSION: 0
CONSTIPATION: 0
DIZZINESS: 0
POLYPHAGIA: 0
SHORTNESS OF BREATH: 0
DYSURIA: 0
POLYDIPSIA: 0
WHEEZING: 0
UNEXPECTED WEIGHT CHANGE: 0
DIARRHEA: 0
FREQUENCY: 0
NERVOUS/ANXIOUS: 0
LIGHT-HEADEDNESS: 0
NUMBNESS: 0
ABDOMINAL PAIN: 0
CHEST TIGHTNESS: 0
ADENOPATHY: 0
PALPITATIONS: 0
SWEATS: 0
DYSPHORIC MOOD: 0
HEADACHES: 1
FEVER: 0
SINUS PRESSURE: 0
VOMITING: 0
NAUSEA: 0
SINUS PAIN: 0

## 2024-08-23 ASSESSMENT — COPD QUESTIONNAIRES: COPD: 0

## 2024-08-23 NOTE — PROGRESS NOTES
Subjective   Patient ID: Deborah Boston is a 79 y.o. female who presents for Cough.    Virtual or Telephone Consent    A telephone visit (audio only) between the patient (at the originating site) and the provider (at the distant site) was utilized to provide this telehealth service.   Verbal consent was requested and obtained from Deborah Boston on this date, 08/23/24 for a telehealth visit.      Cough  This is a new problem. Episode onset: 10 days ago. The problem has been unchanged. The problem occurs constantly. The cough is Productive of sputum. Associated symptoms include chills, ear congestion, headaches, nasal congestion, postnasal drip and rhinorrhea. Pertinent negatives include no chest pain, ear pain, fever, sore throat, shortness of breath, sweats or wheezing. Exacerbated by: going outside. Treatments tried: flonase, acetaminophen, ibuprofen. The treatment provided mild relief. Her past medical history is significant for environmental allergies. There is no history of asthma, COPD or emphysema.         Review of Systems   Constitutional:  Positive for chills. Negative for diaphoresis, fever and unexpected weight change.   HENT:  Positive for postnasal drip and rhinorrhea. Negative for congestion, ear pain, sinus pressure, sinus pain, sneezing and sore throat.    Respiratory:  Positive for cough. Negative for chest tightness, shortness of breath and wheezing.    Cardiovascular:  Negative for chest pain, palpitations and leg swelling.   Gastrointestinal:  Negative for abdominal pain, constipation, diarrhea, nausea and vomiting.   Endocrine: Negative for cold intolerance, heat intolerance, polydipsia, polyphagia and polyuria.   Genitourinary:  Negative for dysuria, frequency, hematuria and urgency.   Allergic/Immunologic: Positive for environmental allergies.   Neurological:  Positive for headaches. Negative for dizziness, syncope, light-headedness and numbness.   Hematological:  Negative for  adenopathy.   Psychiatric/Behavioral:  Negative for confusion and dysphoric mood. The patient is not nervous/anxious.          Assessment/Plan   Problem List Items Addressed This Visit       Acute sinus infection     - start z-alisson  - tessalon as needed  - Call if symptoms worsen or persist.           Relevant Medications    benzonatate (Tessalon) 200 mg capsule    azithromycin (Zithromax) 250 mg tablet    Allergic rhinitis - Primary     - continue flonase  - start loratadine 10 mg daily   - start montelukast 10 mg daily          Relevant Medications    loratadine (Claritin) 10 mg tablet    fluticasone (Flonase) 50 mcg/actuation nasal spray    montelukast (Singulair) 10 mg tablet       This telephone visit lasted approximately 12 minutes.

## 2024-08-23 NOTE — PATIENT INSTRUCTIONS
Deborah Boston ,    Thank you for coming in today. We at [facility] appreciate your trust in our care. If you have any questions or concerns about the care you received today, please do not hesitate to contact us at [phone].    The following instructions were discussed today:    - continue flonase  - start loratadine 10 mg daily   - start singulair 10 mg daily

## 2024-08-24 DIAGNOSIS — I10 ESSENTIAL HYPERTENSION, BENIGN: ICD-10-CM

## 2024-08-26 RX ORDER — METOPROLOL SUCCINATE 50 MG/1
50 TABLET, EXTENDED RELEASE ORAL DAILY
Qty: 80 TABLET | Refills: 3 | OUTPATIENT
Start: 2024-08-26

## 2024-09-27 ENCOUNTER — APPOINTMENT (OUTPATIENT)
Dept: CARDIOLOGY | Facility: HOSPITAL | Age: 79
End: 2024-09-27
Payer: MEDICARE

## 2024-09-27 ENCOUNTER — TELEPHONE (OUTPATIENT)
Dept: PRIMARY CARE | Facility: CLINIC | Age: 79
End: 2024-09-27
Payer: MEDICARE

## 2024-09-27 ENCOUNTER — APPOINTMENT (OUTPATIENT)
Dept: RADIOLOGY | Facility: HOSPITAL | Age: 79
End: 2024-09-27
Payer: MEDICARE

## 2024-09-27 ENCOUNTER — HOSPITAL ENCOUNTER (EMERGENCY)
Facility: HOSPITAL | Age: 79
Discharge: HOME | End: 2024-09-27
Attending: EMERGENCY MEDICINE
Payer: MEDICARE

## 2024-09-27 ENCOUNTER — HOSPITAL ENCOUNTER (EMERGENCY)
Facility: HOSPITAL | Age: 79
Discharge: OTHER INSTITUTION | End: 2024-09-27
Payer: MEDICARE

## 2024-09-27 VITALS
TEMPERATURE: 98.3 F | BODY MASS INDEX: 23.6 KG/M2 | SYSTOLIC BLOOD PRESSURE: 175 MMHG | OXYGEN SATURATION: 99 % | DIASTOLIC BLOOD PRESSURE: 92 MMHG | RESPIRATION RATE: 16 BRPM | HEIGHT: 61 IN | WEIGHT: 125 LBS | HEART RATE: 63 BPM

## 2024-09-27 VITALS
DIASTOLIC BLOOD PRESSURE: 91 MMHG | HEART RATE: 62 BPM | BODY MASS INDEX: 23.73 KG/M2 | SYSTOLIC BLOOD PRESSURE: 173 MMHG | WEIGHT: 125.66 LBS | HEIGHT: 61 IN | RESPIRATION RATE: 16 BRPM | OXYGEN SATURATION: 99 % | TEMPERATURE: 95.5 F

## 2024-09-27 DIAGNOSIS — G44.209 ACUTE NON INTRACTABLE TENSION-TYPE HEADACHE: ICD-10-CM

## 2024-09-27 DIAGNOSIS — H92.02 LEFT EAR PAIN: Primary | ICD-10-CM

## 2024-09-27 DIAGNOSIS — I10 PRIMARY HYPERTENSION: ICD-10-CM

## 2024-09-27 DIAGNOSIS — I10 ESSENTIAL HYPERTENSION: ICD-10-CM

## 2024-09-27 DIAGNOSIS — I10 HYPERTENSION, UNSPECIFIED TYPE: Primary | ICD-10-CM

## 2024-09-27 DIAGNOSIS — J01.90 ACUTE SINUSITIS, RECURRENCE NOT SPECIFIED, UNSPECIFIED LOCATION: ICD-10-CM

## 2024-09-27 LAB — ERYTHROCYTE [SEDIMENTATION RATE] IN BLOOD BY WESTERGREN METHOD: 2 MM/H (ref 0–30)

## 2024-09-27 PROCEDURE — 85652 RBC SED RATE AUTOMATED: CPT | Performed by: EMERGENCY MEDICINE

## 2024-09-27 PROCEDURE — 99285 EMERGENCY DEPT VISIT HI MDM: CPT | Mod: 25

## 2024-09-27 PROCEDURE — 36415 COLL VENOUS BLD VENIPUNCTURE: CPT | Performed by: EMERGENCY MEDICINE

## 2024-09-27 PROCEDURE — 99281 EMR DPT VST MAYX REQ PHY/QHP: CPT

## 2024-09-27 PROCEDURE — 93005 ELECTROCARDIOGRAM TRACING: CPT

## 2024-09-27 PROCEDURE — 70450 CT HEAD/BRAIN W/O DYE: CPT | Performed by: RADIOLOGY

## 2024-09-27 PROCEDURE — 70450 CT HEAD/BRAIN W/O DYE: CPT

## 2024-09-27 PROCEDURE — 2500000001 HC RX 250 WO HCPCS SELF ADMINISTERED DRUGS (ALT 637 FOR MEDICARE OP): Performed by: EMERGENCY MEDICINE

## 2024-09-27 RX ORDER — IBUPROFEN 600 MG/1
600 TABLET ORAL ONCE
Status: COMPLETED | OUTPATIENT
Start: 2024-09-27 | End: 2024-09-27

## 2024-09-27 RX ORDER — CHLORTHALIDONE 25 MG/1
50 TABLET ORAL DAILY
Qty: 180 TABLET | Refills: 0 | Status: SHIPPED | OUTPATIENT
Start: 2024-09-27 | End: 2024-10-03 | Stop reason: SDUPTHER

## 2024-09-27 RX ORDER — LOSARTAN POTASSIUM 50 MG/1
50 TABLET ORAL ONCE
Status: DISCONTINUED | OUTPATIENT
Start: 2024-09-27 | End: 2024-09-27 | Stop reason: HOSPADM

## 2024-09-27 RX ORDER — METOPROLOL TARTRATE 50 MG/1
50 TABLET ORAL ONCE
Status: DISCONTINUED | OUTPATIENT
Start: 2024-09-27 | End: 2024-09-27 | Stop reason: HOSPADM

## 2024-09-27 RX ADMIN — IBUPROFEN 600 MG: 600 TABLET, FILM COATED ORAL at 16:58

## 2024-09-27 ASSESSMENT — PAIN DESCRIPTION - PAIN TYPE: TYPE: ACUTE PAIN

## 2024-09-27 ASSESSMENT — LIFESTYLE VARIABLES
HAVE YOU EVER FELT YOU SHOULD CUT DOWN ON YOUR DRINKING: NO
EVER FELT BAD OR GUILTY ABOUT YOUR DRINKING: NO
EVER HAD A DRINK FIRST THING IN THE MORNING TO STEADY YOUR NERVES TO GET RID OF A HANGOVER: NO
HAVE PEOPLE ANNOYED YOU BY CRITICIZING YOUR DRINKING: NO
TOTAL SCORE: 0

## 2024-09-27 ASSESSMENT — PAIN - FUNCTIONAL ASSESSMENT: PAIN_FUNCTIONAL_ASSESSMENT: 0-10

## 2024-09-27 ASSESSMENT — PAIN SCALES - GENERAL: PAINLEVEL_OUTOF10: 2

## 2024-09-27 ASSESSMENT — COLUMBIA-SUICIDE SEVERITY RATING SCALE - C-SSRS
1. IN THE PAST MONTH, HAVE YOU WISHED YOU WERE DEAD OR WISHED YOU COULD GO TO SLEEP AND NOT WAKE UP?: NO
6. HAVE YOU EVER DONE ANYTHING, STARTED TO DO ANYTHING, OR PREPARED TO DO ANYTHING TO END YOUR LIFE?: NO
2. HAVE YOU ACTUALLY HAD ANY THOUGHTS OF KILLING YOURSELF?: NO
1. IN THE PAST MONTH, HAVE YOU WISHED YOU WERE DEAD OR WISHED YOU COULD GO TO SLEEP AND NOT WAKE UP?: NO
6. HAVE YOU EVER DONE ANYTHING, STARTED TO DO ANYTHING, OR PREPARED TO DO ANYTHING TO END YOUR LIFE?: NO
2. HAVE YOU ACTUALLY HAD ANY THOUGHTS OF KILLING YOURSELF?: NO

## 2024-09-27 ASSESSMENT — PAIN DESCRIPTION - LOCATION: LOCATION: HEAD

## 2024-09-27 NOTE — TELEPHONE ENCOUNTER
Patient left a voicemail and stated she woke up this morning around 5am with ringing in her ears. Patient went to the ER this morning but didn't take her blood pressure medication before going. She stated the ER advised her the ringing is from her blood pressure being elevated, and they recommended patient to go home take her medicine and see if that helps. Patient wanted to see Dr. Maria, spoke with Dr. Maria and she recommended patient going back to the ER. Left detailed message on patient's voicemail advising of this, will try again before I leave today.

## 2024-09-27 NOTE — ED PROVIDER NOTES
HPI   Chief Complaint   Patient presents with    Earache     BEATING SOUND IN LEFT EAR STARTED AT 5AM THIS MORNING       This is a 79-year-old female, with a past medical history of hypertension, that is presenting to the emergency room with complaints of hearing her heartbeat in her ear.  She woke up early this morning and her her heartbeat sound in her ear.  She reports that she is having a mild left-sided headache.  She denies any visual changes, speech changes, paresthesias, focal weakness, facial droop, chest pain, shortness of breath, dizziness, abdominal pain, nausea, vomiting, or diaphoresis.  The patient was concerned because her mom and her father both  of cardiac complications.  The patient reports that her blood pressure is higher than normal when she arrived to the emergency room.  She did not take her medications prior to leaving her house.  She also endorsed eating pizza last night.      History provided by:  Patient   used: No            Patient History   Past Medical History:   Diagnosis Date    Acute pain of left knee 2023    Anesthesia of skin 10/07/2021    Numbness of fingers of both hands    Anesthesia of skin 10/07/2021    Numbness of fingers of both hands    Carpal tunnel syndrome, bilateral upper limbs 2023    Cervicalgia 2021    Neck pain    Cervicalgia 2021    Neck pain    Cervicalgia 2021    Neck pain    Disorder of the skin and subcutaneous tissue, unspecified 2021    Foot lesion    Disorder of the skin and subcutaneous tissue, unspecified 2021    Foot lesion    Encounter for screening for depression 2022    Positive screening for depression on 2-item Patient Health Questionnaire (PHQ-2)    History of colonic polyps 2023    Neck pain 2023    Occipital neuralgia 2020    Occipital neuralgia of left side    Other conditions influencing health status 2022    History of cough    Other specified  cardiac arrhythmias     Sinus arrhythmia    Other specified counseling 05/09/2022    Advanced directives, counseling/discussion    Pain in left foot 10/29/2019    Left foot pain    Pelvic and perineal pain 07/24/2021    Pelvic pain    Personal history of other diseases of the circulatory system     History of hypertension    Personal history of other diseases of the respiratory system 05/09/2022    History of acute sinusitis    Personal history of other diseases of the respiratory system 05/09/2022    History of acute sinusitis    Personal history of other diseases of the respiratory system 05/26/2022    History of paranasal sinus congestion    Personal history of other drug therapy     COVID-19 vaccine series completed    Personal history of other endocrine, nutritional and metabolic disease     History of diabetes mellitus    Personal history of other mental and behavioral disorders     History of depression    Personal history of other specified conditions     History of urinary frequency    Personal history of other specified conditions 10/20/2022    History of urinary incontinence    Personal history of other specified conditions 12/06/2019    History of chest pain    Personal history of other specified conditions 01/19/2022    History of urinary urgency    Personal history of urinary (tract) infections 10/20/2022    History of urinary tract infection    Unspecified abdominal pain 07/07/2021    Abdominal pressure    Unspecified osteoarthritis, unspecified site 11/17/2022    Arthritis    Unspecified osteoarthritis, unspecified site 11/17/2022    Arthritis    Unspecified osteoarthritis, unspecified site 11/17/2022    Arthritis    Unspecified osteoarthritis, unspecified site 11/17/2022    Arthritis     Past Surgical History:   Procedure Laterality Date    CARPAL TUNNEL RELEASE Right 09/12/2023    OTHER SURGICAL HISTORY  09/27/2019    Shoulder surgery    OTHER SURGICAL HISTORY  09/27/2019    Hysterectomy    OTHER  SURGICAL HISTORY  2019    Tubal ligation    OTHER SURGICAL HISTORY  2019    Breast biopsy    OTHER SURGICAL HISTORY  2019    Hernia repair    OTHER SURGICAL HISTORY  2022    Carpal tunnel surgery     Family History   Problem Relation Name Age of Onset    Coronary artery disease Mother      Hypertension Mother      Coronary artery disease Father      Coronary artery disease Sister      Hypertension Sister      Coronary artery disease Brother      Hypertension Brother      Colon cancer Brother       Social History     Tobacco Use    Smoking status: Former     Current packs/day: 0.00     Types: Cigarettes     Quit date:      Years since quittin.7    Smokeless tobacco: Never   Vaping Use    Vaping status: Never Used   Substance Use Topics    Alcohol use: Not Currently    Drug use: Never       Physical Exam   ED Triage Vitals [24 0742]   Temperature Heart Rate Respirations BP   35.3 °C (95.5 °F) 62 16 (!) 173/91      Pulse Ox Temp Source Heart Rate Source Patient Position   99 % Tympanic Monitor Sitting      BP Location FiO2 (%)     Left arm --       Physical Exam  Vitals and nursing note reviewed.   Constitutional:       Appearance: Normal appearance. She is normal weight.   HENT:      Head: Normocephalic and atraumatic.      Right Ear: Tympanic membrane and external ear normal.      Left Ear: Tympanic membrane and external ear normal.      Nose: Nose normal.      Mouth/Throat:      Mouth: Mucous membranes are moist.      Pharynx: Oropharynx is clear.   Eyes:      Extraocular Movements: Extraocular movements intact.      Conjunctiva/sclera: Conjunctivae normal.      Pupils: Pupils are equal, round, and reactive to light.   Cardiovascular:      Rate and Rhythm: Normal rate and regular rhythm.      Pulses: Normal pulses.   Pulmonary:      Effort: Pulmonary effort is normal.      Breath sounds: Normal breath sounds.   Abdominal:      General: Bowel sounds are normal.      Palpations:  Abdomen is soft.   Genitourinary:     Comments: No CVA tenderness or pubic pain.  Musculoskeletal:         General: Normal range of motion.   Skin:     General: Skin is warm and dry.      Capillary Refill: Capillary refill takes less than 2 seconds.   Neurological:      General: No focal deficit present.      Mental Status: She is alert and oriented to person, place, and time.   Psychiatric:         Mood and Affect: Mood normal.           ED Course & MDM   Diagnoses as of 09/27/24 0843   Hypertension, unspecified type                 No data recorded     [place] Coma Scale Score: 15 (09/27/24 0743 : Cj Crandall, EMT)                           Medical Decision Making  The patient was seen and evaluated with the attending physician, Dr. Aguilar.  The patient is presenting to the emergency room with hearing her heartbeat in her left ear upon waking this morning.  The patient reported a remote sinus infection which has been successfully treated.  She denies any new infections.  Denies any head injury.  Not experiencing any ear drainage or loss of hearing.  She is not having any dental pain.  Differential diagnosis includes otitis media, otitis externa, perforation, cephalgia, migraine headache, hypertension, giant cell arteritis, or other acute process.  The patient was visualized and had no evidence of otitis media or externa.  Patient is not displaying any neurological deficits.  The patient is not having any reproducible dental pain.  The patient ate pizza last night and has not taken her antihypertensive medication.  We believe the patient's symptoms are most likely as a result of elevated blood pressure.  We offered the patient treatment with her blood pressure medications, Cozaar and metoprolol, in the emergency room versus being discharged and her taking her medications at home.  The patient opted to receive her medications in the emergency room.  The medications were ordered and when the nurse went to  administer the medications the patient had decided that she wanted to leave.  The patient left the ER in stable condition.        Procedure  Procedures     TYRONE Omer  09/27/24 0841       TYRONE Omer  09/27/24 0843

## 2024-09-27 NOTE — ED PROVIDER NOTES
HPI   Chief Complaint   Patient presents with   • Earache     Patient states she was here this AM and was called back in. Throbbing in her left ear that has not resolved.        This is a 79-year-old  female presenting to the emergency room again for reevaluation of earache and headache.  Patient was seen earlier this morning with complaints of hearing her heartbeat in her ear upon waking this morning.  She states that she has a mild left-sided headache.  She not have any acute neurological deficits on assessment this morning.  She was found to have an elevated blood pressure and had not taken her blood pressure medications.  We thought her symptoms were most likely as a result of the elevated blood pressure.  We did offer blood pressure medications in the emergency room and the patient declined stating she wanted to go home and take her own.  The patient took her medications and called her doctor.  She states her blood pressure has not changed.  Her blood pressure is almost 20 points lower than what it was this morning.  Her vital signs are stable.  The patient states that the doctor sent her back into the emergency room for evaluation.  She is still not having any vision changes, speech changes, paresthesias, focal weakness, nausea, vomiting, fever, cold-like symptoms, abdominal pain, or alteration in her urine or bowel function.  She denies any head trauma.  She is not on any blood thinners.            Patient History   Past Medical History:   Diagnosis Date   • Acute pain of left knee 11/16/2023   • Anesthesia of skin 10/07/2021    Numbness of fingers of both hands   • Anesthesia of skin 10/07/2021    Numbness of fingers of both hands   • Carpal tunnel syndrome, bilateral upper limbs 03/05/2023   • Cervicalgia 11/23/2021    Neck pain   • Cervicalgia 11/23/2021    Neck pain   • Cervicalgia 11/23/2021    Neck pain   • Disorder of the skin and subcutaneous tissue, unspecified 11/23/2021    Foot lesion   •  Disorder of the skin and subcutaneous tissue, unspecified 11/23/2021    Foot lesion   • Encounter for screening for depression 02/03/2022    Positive screening for depression on 2-item Patient Health Questionnaire (PHQ-2)   • History of colonic polyps 06/26/2023   • Neck pain 03/05/2023   • Occipital neuralgia 01/03/2020    Occipital neuralgia of left side   • Other conditions influencing health status 01/06/2022    History of cough   • Other specified cardiac arrhythmias     Sinus arrhythmia   • Other specified counseling 05/09/2022    Advanced directives, counseling/discussion   • Pain in left foot 10/29/2019    Left foot pain   • Pelvic and perineal pain 07/24/2021    Pelvic pain   • Personal history of other diseases of the circulatory system     History of hypertension   • Personal history of other diseases of the respiratory system 05/09/2022    History of acute sinusitis   • Personal history of other diseases of the respiratory system 05/09/2022    History of acute sinusitis   • Personal history of other diseases of the respiratory system 05/26/2022    History of paranasal sinus congestion   • Personal history of other drug therapy     COVID-19 vaccine series completed   • Personal history of other endocrine, nutritional and metabolic disease     History of diabetes mellitus   • Personal history of other mental and behavioral disorders     History of depression   • Personal history of other specified conditions     History of urinary frequency   • Personal history of other specified conditions 10/20/2022    History of urinary incontinence   • Personal history of other specified conditions 12/06/2019    History of chest pain   • Personal history of other specified conditions 01/19/2022    History of urinary urgency   • Personal history of urinary (tract) infections 10/20/2022    History of urinary tract infection   • Unspecified abdominal pain 07/07/2021    Abdominal pressure   • Unspecified osteoarthritis,  unspecified site 2022    Arthritis   • Unspecified osteoarthritis, unspecified site 2022    Arthritis   • Unspecified osteoarthritis, unspecified site 2022    Arthritis   • Unspecified osteoarthritis, unspecified site 2022    Arthritis     Past Surgical History:   Procedure Laterality Date   • CARPAL TUNNEL RELEASE Right 2023   • OTHER SURGICAL HISTORY  2019    Shoulder surgery   • OTHER SURGICAL HISTORY  2019    Hysterectomy   • OTHER SURGICAL HISTORY  2019    Tubal ligation   • OTHER SURGICAL HISTORY  2019    Breast biopsy   • OTHER SURGICAL HISTORY  2019    Hernia repair   • OTHER SURGICAL HISTORY  2022    Carpal tunnel surgery     Family History   Problem Relation Name Age of Onset   • Coronary artery disease Mother     • Hypertension Mother     • Coronary artery disease Father     • Coronary artery disease Sister     • Hypertension Sister     • Coronary artery disease Brother     • Hypertension Brother     • Colon cancer Brother       Social History     Tobacco Use   • Smoking status: Former     Current packs/day: 0.00     Types: Cigarettes     Quit date:      Years since quittin.7   • Smokeless tobacco: Never   Vaping Use   • Vaping status: Never Used   Substance Use Topics   • Alcohol use: Not Currently   • Drug use: Never       Physical Exam   ED Triage Vitals [24 1307]   Temperature Heart Rate Respirations BP   36.8 °C (98.3 °F) 73 16 158/89      Pulse Ox Temp src Heart Rate Source Patient Position   98 % -- Monitor Sitting      BP Location FiO2 (%)     Left arm --       Physical Exam  Vitals and nursing note reviewed.   Constitutional:       Appearance: Normal appearance. She is normal weight.   HENT:      Head: Normocephalic and atraumatic.      Right Ear: Tympanic membrane normal.      Left Ear: Tympanic membrane normal.      Nose: Nose normal.      Mouth/Throat:      Mouth: Mucous membranes are moist.      Pharynx:  Oropharynx is clear.   Eyes:      Extraocular Movements: Extraocular movements intact.      Conjunctiva/sclera: Conjunctivae normal.      Pupils: Pupils are equal, round, and reactive to light.   Cardiovascular:      Rate and Rhythm: Normal rate and regular rhythm.      Pulses: Normal pulses.   Pulmonary:      Effort: Pulmonary effort is normal.      Breath sounds: Normal breath sounds.   Abdominal:      General: Abdomen is flat. Bowel sounds are normal.      Palpations: Abdomen is soft.   Genitourinary:     Comments: No CVA tenderness or pubic pain.  Musculoskeletal:         General: Normal range of motion.   Skin:     General: Skin is warm and dry.      Capillary Refill: Capillary refill takes less than 2 seconds.   Neurological:      General: No focal deficit present.      Mental Status: She is alert and oriented to person, place, and time.      GCS: GCS eye subscore is 4. GCS verbal subscore is 5. GCS motor subscore is 6.      Cranial Nerves: Cranial nerves 2-12 are intact.      Sensory: Sensation is intact.      Motor: Motor function is intact.      Coordination: Coordination is intact.      Gait: Gait is intact.      Deep Tendon Reflexes: Reflexes are normal and symmetric.   Psychiatric:         Mood and Affect: Mood normal.         Judgment: Judgment normal.         ED Course & MDM   ED Course as of 09/27/24 1649   Fri Sep 27, 2024   1420 This patient was seen by the advanced practice provider.  I have personally performed a substantive portion of the encounter.  I have seen and examined the patient; agree with the workup, evaluation, MDM, management and diagnosis.  The care plan has been discussed.      I personally saw the patient and made/approved the management plan and take responsibility for the patient management.    History: Patient returns back with headache and pulsating sensation in her left face  Exam: Vitals stable, hypertensive, no focal deficits  MDM: Below   []   1444 ECG 12  lead  Twelve-lead EKG interpreted by me.  Sinus rhythm at a rate of 69.  PA interval is 209, QRS 96, QT is 388, QTc is 416.  Normal axis.  Prolonged PA interval.  No acute ischemia or injury pattern noted. [CT]   1519 Sed Rate: 2 []   1642 CT head wo IV contrast  Ct head read as negative []   1642 Ibuprofen given here for headache.  Will increase chlorthalidone per recommendations of PCP.  Prescription sent.    No indication for admission.  Discussed findings and diagnosis with the patient, follow-up and return to ED precautions given, patient voiced understanding, agrees with plan, questions answered, patient was discharged in stable condition. []      ED Course User Index  [CT] Yaima Saleh, APRN-CNP  [] Jordy Aguilar MD                 No data recorded     [place] Coma Scale Score: 15 (09/27/24 1409 : Lizett Foreman RN)                           Medical Decision Making  The patient was seen and evaluated with the attending physician, Dr. Aguilar.  On reassessment the patient does not have any acute neurological deficits.  The patient's blood pressure was reevaluated and was 138/89 which is dramatically improved since this morning.  We consulted with the patient's primary care physician who suggested that we add amlodipine to her current antihypertensive medications.  The patient has a stated allergy to amlodipine.  It was suggested that we increase the patient's chlorthalidone to 50 mg p.o. this is the patient's second visit.  We will obtain a CT of the head to rule out any acute intracranial process.  We will also obtain a sed rate for assessment of possible fold giant cell arteritis.        Procedure  Procedures     Jordy Aguilar MD  09/27/24 3425

## 2024-10-01 LAB
ATRIAL RATE: 69 BPM
P AXIS: 40 DEGREES
PR INTERVAL: 209 MS
Q ONSET: 249 MS
QRS COUNT: 11 BEATS
QRS DURATION: 96 MS
QT INTERVAL: 388 MS
QTC CALCULATION(BAZETT): 416 MS
QTC FREDERICIA: 406 MS
R AXIS: 10 DEGREES
T AXIS: 0 DEGREES
T OFFSET: 443 MS
VENTRICULAR RATE: 69 BPM

## 2024-10-03 ENCOUNTER — APPOINTMENT (OUTPATIENT)
Dept: LAB | Facility: LAB | Age: 79
End: 2024-10-03
Payer: MEDICARE

## 2024-10-03 ENCOUNTER — OFFICE VISIT (OUTPATIENT)
Dept: PRIMARY CARE | Facility: CLINIC | Age: 79
End: 2024-10-03
Payer: MEDICARE

## 2024-10-03 VITALS
HEIGHT: 61 IN | WEIGHT: 120 LBS | BODY MASS INDEX: 22.66 KG/M2 | HEART RATE: 69 BPM | RESPIRATION RATE: 16 BRPM | SYSTOLIC BLOOD PRESSURE: 124 MMHG | DIASTOLIC BLOOD PRESSURE: 70 MMHG | OXYGEN SATURATION: 96 %

## 2024-10-03 DIAGNOSIS — H93.A2 PULSATILE TINNITUS OF LEFT EAR: ICD-10-CM

## 2024-10-03 DIAGNOSIS — R42 DIZZINESS AND GIDDINESS: ICD-10-CM

## 2024-10-03 DIAGNOSIS — I10 ESSENTIAL HYPERTENSION: Primary | ICD-10-CM

## 2024-10-03 PROBLEM — E11.40 TYPE 2 DIABETES MELLITUS WITH DIABETIC NEUROPATHY, UNSPECIFIED (MULTI): Status: ACTIVE | Noted: 2024-10-03

## 2024-10-03 PROCEDURE — 1157F ADVNC CARE PLAN IN RCRD: CPT | Performed by: FAMILY MEDICINE

## 2024-10-03 PROCEDURE — 1123F ACP DISCUSS/DSCN MKR DOCD: CPT | Performed by: FAMILY MEDICINE

## 2024-10-03 PROCEDURE — 99213 OFFICE O/P EST LOW 20 MIN: CPT | Performed by: FAMILY MEDICINE

## 2024-10-03 PROCEDURE — 1159F MED LIST DOCD IN RCRD: CPT | Performed by: FAMILY MEDICINE

## 2024-10-03 PROCEDURE — 3078F DIAST BP <80 MM HG: CPT | Performed by: FAMILY MEDICINE

## 2024-10-03 PROCEDURE — 3074F SYST BP LT 130 MM HG: CPT | Performed by: FAMILY MEDICINE

## 2024-10-03 RX ORDER — CHLORTHALIDONE 50 MG/1
50 TABLET ORAL DAILY
Qty: 90 TABLET | Refills: 1 | Status: SHIPPED | OUTPATIENT
Start: 2024-10-03 | End: 2025-04-01

## 2024-10-03 RX ORDER — METOPROLOL SUCCINATE 50 MG/1
50 TABLET, EXTENDED RELEASE ORAL DAILY
Qty: 90 TABLET | Refills: 1 | Status: SHIPPED | OUTPATIENT
Start: 2024-10-03 | End: 2025-04-01

## 2024-10-03 ASSESSMENT — ENCOUNTER SYMPTOMS
CHEST TIGHTNESS: 0
DIARRHEA: 0
CONSTIPATION: 0
SHORTNESS OF BREATH: 0
FEVER: 0
FREQUENCY: 0
SINUS PRESSURE: 0
HEADACHES: 0
NERVOUS/ANXIOUS: 0
NAUSEA: 0
POLYDIPSIA: 0
PALPITATIONS: 0
DIZZINESS: 0
POLYPHAGIA: 0
SINUS PAIN: 0
CHILLS: 0
VOMITING: 0
LIGHT-HEADEDNESS: 0
ABDOMINAL PAIN: 0
ADENOPATHY: 0
DYSURIA: 0
WHEEZING: 0
DIAPHORESIS: 0
COUGH: 0
DYSPHORIC MOOD: 0
CONFUSION: 0
UNEXPECTED WEIGHT CHANGE: 0
HEMATURIA: 0
SORE THROAT: 0
NUMBNESS: 0

## 2024-10-03 ASSESSMENT — PATIENT HEALTH QUESTIONNAIRE - PHQ9
SUM OF ALL RESPONSES TO PHQ9 QUESTIONS 1 AND 2: 0
1. LITTLE INTEREST OR PLEASURE IN DOING THINGS: NOT AT ALL
2. FEELING DOWN, DEPRESSED OR HOPELESS: NOT AT ALL

## 2024-10-03 NOTE — PATIENT INSTRUCTIONS
Deborah Boston ,    Thank you for coming in today. We at [facility] appreciate your trust in our care. If you have any questions or concerns about the care you received today, please do not hesitate to contact us at [phone].    The following instructions were discussed today:    - get ultrasound of neck arteries   - Follow up 11/26/2024 as scheduled.

## 2024-10-03 NOTE — ASSESSMENT & PLAN NOTE
- controlled on current regimen (chorthalidone was increased to 50 mg daily during recent ED trip).   - continue Toprol XL 50 mg daily, losartan 50 mg twice daily, chlorthalidone 50 mg once daily

## 2024-10-03 NOTE — PROGRESS NOTES
"Subjective   Patient ID: Deborah Boston is a 79 y.o. female who presents for Follow-up (Still feeling heart beat in ear but not as much).    HPI   Here for ED follow up. Was in ED twice on 9/27/24. First time was for hearing her heart beat in her left ear. Blood pressure was 173/91. She had not taken her medication. She was discharge and told to take her medication. Went back to the ED later that day because the throbbing in her ear remained. EKG was normal. CT head was negative. Blood pressure was improved at 158/89 and was 138/89 on recheck. Chorthalidone was increased to 50 mg.    Blood pressure today is better at 124/70. She states she is feeling much better. Still having the pulsing in her ear but not as severely.     Review of Systems   Constitutional:  Negative for chills, diaphoresis, fever and unexpected weight change.   HENT:  Negative for congestion, sinus pressure, sinus pain, sneezing and sore throat.    Respiratory:  Negative for cough, chest tightness, shortness of breath and wheezing.    Cardiovascular:  Negative for chest pain, palpitations and leg swelling.   Gastrointestinal:  Negative for abdominal pain, constipation, diarrhea, nausea and vomiting.   Endocrine: Negative for cold intolerance, heat intolerance, polydipsia, polyphagia and polyuria.   Genitourinary:  Negative for dysuria, frequency, hematuria and urgency.   Neurological:  Negative for dizziness, syncope, light-headedness, numbness and headaches.   Hematological:  Negative for adenopathy.   Psychiatric/Behavioral:  Negative for confusion and dysphoric mood. The patient is not nervous/anxious.        Objective   /70 (BP Location: Right arm, Patient Position: Sitting, BP Cuff Size: Adult)   Pulse 69   Resp 16   Ht 1.549 m (5' 1\")   Wt 54.4 kg (120 lb)   SpO2 96%   BMI 22.67 kg/m²     Physical Exam  Vitals and nursing note reviewed.   Constitutional:       General: She is not in acute distress.     Appearance: Normal " appearance.   HENT:      Head: Normocephalic and atraumatic.      Nose: Nose normal.   Eyes:      Extraocular Movements: Extraocular movements intact.      Conjunctiva/sclera: Conjunctivae normal.      Pupils: Pupils are equal, round, and reactive to light.   Cardiovascular:      Rate and Rhythm: Normal rate and regular rhythm.      Heart sounds: No murmur heard.     No friction rub. No gallop.   Pulmonary:      Effort: Pulmonary effort is normal.      Breath sounds: Normal breath sounds. No wheezing, rhonchi or rales.   Abdominal:      General: Bowel sounds are normal. There is no distension.      Palpations: Abdomen is soft.      Tenderness: There is no abdominal tenderness.   Musculoskeletal:         General: Normal range of motion.      Cervical back: Normal range of motion and neck supple.   Skin:     General: Skin is warm and dry.   Neurological:      General: No focal deficit present.      Mental Status: She is alert and oriented to person, place, and time.      Deep Tendon Reflexes: Reflexes normal.   Psychiatric:         Mood and Affect: Mood normal.         Behavior: Behavior normal.         Thought Content: Thought content normal.         Judgment: Judgment normal.         Assessment/Plan   Problem List Items Addressed This Visit             ICD-10-CM    Essential hypertension - Primary I10     - controlled on current regimen (chorthalidone was increased to 50 mg daily during recent ED trip).   - continue Toprol XL 50 mg daily, losartan 50 mg twice daily, chlorthalidone 50 mg once daily          Relevant Medications    chlorthalidone (Hygroton) 50 mg tablet    metoprolol succinate XL (Toprol-XL) 50 mg 24 hr tablet    Pulsatile tinnitus of left ear H93.A2    Relevant Orders    Basic metabolic panel    Vascular US Carotid Artery Duplex Bilateral     Other Visit Diagnoses         Codes    Dizziness and giddiness     R42    Relevant Orders    Vascular US Carotid Artery Duplex Bilateral

## 2024-10-07 ENCOUNTER — LAB (OUTPATIENT)
Dept: LAB | Facility: LAB | Age: 79
End: 2024-10-07
Payer: MEDICARE

## 2024-10-07 DIAGNOSIS — N32.81 OVERACTIVE BLADDER: ICD-10-CM

## 2024-10-07 DIAGNOSIS — I10 ESSENTIAL HYPERTENSION: ICD-10-CM

## 2024-10-07 DIAGNOSIS — R73.03 PREDIABETES: ICD-10-CM

## 2024-10-07 DIAGNOSIS — M79.604 BILATERAL LEG PAIN: ICD-10-CM

## 2024-10-07 DIAGNOSIS — E78.2 MIXED HYPERLIPIDEMIA: ICD-10-CM

## 2024-10-07 DIAGNOSIS — H93.A2 PULSATILE TINNITUS OF LEFT EAR: ICD-10-CM

## 2024-10-07 DIAGNOSIS — M19.90 ARTHRITIS: ICD-10-CM

## 2024-10-07 DIAGNOSIS — F41.9 ANXIETY: ICD-10-CM

## 2024-10-07 DIAGNOSIS — M53.9 MULTILEVEL DEGENERATIVE DISC DISEASE: ICD-10-CM

## 2024-10-07 DIAGNOSIS — E78.1 ESSENTIAL HYPERTRIGLYCERIDEMIA: ICD-10-CM

## 2024-10-07 DIAGNOSIS — M79.605 BILATERAL LEG PAIN: ICD-10-CM

## 2024-10-07 DIAGNOSIS — D72.829 LEUKOCYTOSIS, UNSPECIFIED TYPE: ICD-10-CM

## 2024-10-07 DIAGNOSIS — Z23 ENCOUNTER FOR IMMUNIZATION: ICD-10-CM

## 2024-10-07 DIAGNOSIS — E55.9 VITAMIN D DEFICIENCY: ICD-10-CM

## 2024-10-07 DIAGNOSIS — E87.6 HYPOKALEMIA: ICD-10-CM

## 2024-10-07 DIAGNOSIS — I73.9 PERIPHERAL VASCULAR DISEASE WITH CLAUDICATION (CMS-HCC): ICD-10-CM

## 2024-10-07 DIAGNOSIS — K58.2 IRRITABLE BOWEL SYNDROME WITH BOTH CONSTIPATION AND DIARRHEA: ICD-10-CM

## 2024-10-07 DIAGNOSIS — J30.9 ALLERGIC RHINITIS, UNSPECIFIED SEASONALITY, UNSPECIFIED TRIGGER: ICD-10-CM

## 2024-10-07 LAB
25(OH)D3 SERPL-MCNC: 54 NG/ML (ref 30–100)
ALBUMIN SERPL BCP-MCNC: 4.4 G/DL (ref 3.4–5)
ALP SERPL-CCNC: 59 U/L (ref 33–136)
ALT SERPL W P-5'-P-CCNC: 16 U/L (ref 7–45)
ANION GAP SERPL CALC-SCNC: 12 MMOL/L (ref 10–20)
AST SERPL W P-5'-P-CCNC: 17 U/L (ref 9–39)
BASOPHILS # BLD AUTO: 0.03 X10*3/UL (ref 0–0.1)
BASOPHILS NFR BLD AUTO: 0.5 %
BILIRUB SERPL-MCNC: 0.9 MG/DL (ref 0–1.2)
BUN SERPL-MCNC: 16 MG/DL (ref 6–23)
CALCIUM SERPL-MCNC: 9.8 MG/DL (ref 8.6–10.3)
CHLORIDE SERPL-SCNC: 98 MMOL/L (ref 98–107)
CHOLEST SERPL-MCNC: 129 MG/DL (ref 0–199)
CHOLESTEROL/HDL RATIO: 3
CO2 SERPL-SCNC: 33 MMOL/L (ref 21–32)
CREAT SERPL-MCNC: 0.73 MG/DL (ref 0.5–1.05)
EGFRCR SERPLBLD CKD-EPI 2021: 84 ML/MIN/1.73M*2
EOSINOPHIL # BLD AUTO: 0.17 X10*3/UL (ref 0–0.4)
EOSINOPHIL NFR BLD AUTO: 2.9 %
ERYTHROCYTE [DISTWIDTH] IN BLOOD BY AUTOMATED COUNT: 11.8 % (ref 11.5–14.5)
EST. AVERAGE GLUCOSE BLD GHB EST-MCNC: 123 MG/DL
GLUCOSE SERPL-MCNC: 105 MG/DL (ref 74–99)
HBA1C MFR BLD: 5.9 %
HCT VFR BLD AUTO: 42.5 % (ref 36–46)
HDLC SERPL-MCNC: 43.3 MG/DL
HGB BLD-MCNC: 14.6 G/DL (ref 12–16)
IMM GRANULOCYTES # BLD AUTO: 0.02 X10*3/UL (ref 0–0.5)
IMM GRANULOCYTES NFR BLD AUTO: 0.3 % (ref 0–0.9)
LDLC SERPL CALC-MCNC: 57 MG/DL
LYMPHOCYTES # BLD AUTO: 2.04 X10*3/UL (ref 0.8–3)
LYMPHOCYTES NFR BLD AUTO: 34.8 %
MCH RBC QN AUTO: 30.3 PG (ref 26–34)
MCHC RBC AUTO-ENTMCNC: 34.4 G/DL (ref 32–36)
MCV RBC AUTO: 88 FL (ref 80–100)
MONOCYTES # BLD AUTO: 0.5 X10*3/UL (ref 0.05–0.8)
MONOCYTES NFR BLD AUTO: 8.5 %
NEUTROPHILS # BLD AUTO: 3.11 X10*3/UL (ref 1.6–5.5)
NEUTROPHILS NFR BLD AUTO: 53 %
NON HDL CHOLESTEROL: 86 MG/DL (ref 0–149)
NRBC BLD-RTO: 0 /100 WBCS (ref 0–0)
PLATELET # BLD AUTO: 219 X10*3/UL (ref 150–450)
POTASSIUM SERPL-SCNC: 3.5 MMOL/L (ref 3.5–5.3)
PROT SERPL-MCNC: 6.6 G/DL (ref 6.4–8.2)
RBC # BLD AUTO: 4.82 X10*6/UL (ref 4–5.2)
SODIUM SERPL-SCNC: 139 MMOL/L (ref 136–145)
TRIGL SERPL-MCNC: 145 MG/DL (ref 0–149)
TSH SERPL-ACNC: 1.89 MIU/L (ref 0.44–3.98)
VIT B12 SERPL-MCNC: 400 PG/ML (ref 211–911)
VLDL: 29 MG/DL (ref 0–40)
WBC # BLD AUTO: 5.9 X10*3/UL (ref 4.4–11.3)

## 2024-10-07 PROCEDURE — 82607 VITAMIN B-12: CPT

## 2024-10-07 PROCEDURE — 85025 COMPLETE CBC W/AUTO DIFF WBC: CPT

## 2024-10-07 PROCEDURE — 36415 COLL VENOUS BLD VENIPUNCTURE: CPT

## 2024-10-07 PROCEDURE — 82306 VITAMIN D 25 HYDROXY: CPT

## 2024-10-07 PROCEDURE — 84443 ASSAY THYROID STIM HORMONE: CPT

## 2024-10-07 PROCEDURE — 80053 COMPREHEN METABOLIC PANEL: CPT

## 2024-10-07 PROCEDURE — 83036 HEMOGLOBIN GLYCOSYLATED A1C: CPT

## 2024-10-07 PROCEDURE — 80061 LIPID PANEL: CPT

## 2024-10-25 ENCOUNTER — HOSPITAL ENCOUNTER (OUTPATIENT)
Dept: VASCULAR MEDICINE | Facility: HOSPITAL | Age: 79
Discharge: HOME | End: 2024-10-25
Payer: MEDICARE

## 2024-10-25 DIAGNOSIS — H93.A2 PULSATILE TINNITUS OF LEFT EAR: ICD-10-CM

## 2024-10-25 DIAGNOSIS — R09.89 OTHER SPECIFIED SYMPTOMS AND SIGNS INVOLVING THE CIRCULATORY AND RESPIRATORY SYSTEMS: ICD-10-CM

## 2024-10-25 DIAGNOSIS — R42 DIZZINESS AND GIDDINESS: ICD-10-CM

## 2024-10-25 PROCEDURE — 93880 EXTRACRANIAL BILAT STUDY: CPT

## 2024-10-25 PROCEDURE — 93880 EXTRACRANIAL BILAT STUDY: CPT | Performed by: INTERNAL MEDICINE

## 2024-11-07 ENCOUNTER — TELEPHONE (OUTPATIENT)
Dept: PRIMARY CARE | Facility: CLINIC | Age: 79
End: 2024-11-07
Payer: MEDICARE

## 2024-11-07 DIAGNOSIS — H93.A2 PULSATILE TINNITUS OF LEFT EAR: Primary | ICD-10-CM

## 2024-11-07 NOTE — TELEPHONE ENCOUNTER
Patient stated she saw you not long ago for some throbbing she had in her neck on the left side. You ordered Vascular US Carotid which came back normal. Patient stopped in the office and stated she still has the throbbing in her neck and it subsides during the day but at night time it comes back. Any further recommendations?

## 2024-11-08 NOTE — TELEPHONE ENCOUNTER
Spoke with Dr. Babin office to schedule and they already have the patient scheduled for December 9th @ 1:30. Spoke with patient to make sure she was aware and she confirmed she called and scheduled.

## 2024-11-26 ENCOUNTER — APPOINTMENT (OUTPATIENT)
Dept: PRIMARY CARE | Facility: CLINIC | Age: 79
End: 2024-11-26
Payer: MEDICARE

## 2024-11-26 ENCOUNTER — TELEPHONE (OUTPATIENT)
Dept: PRIMARY CARE | Facility: CLINIC | Age: 79
End: 2024-11-26

## 2024-11-26 VITALS
BODY MASS INDEX: 22.47 KG/M2 | SYSTOLIC BLOOD PRESSURE: 110 MMHG | TEMPERATURE: 98 F | HEART RATE: 72 BPM | HEIGHT: 61 IN | DIASTOLIC BLOOD PRESSURE: 78 MMHG | OXYGEN SATURATION: 98 % | WEIGHT: 119 LBS | RESPIRATION RATE: 16 BRPM

## 2024-11-26 DIAGNOSIS — N32.81 OVERACTIVE BLADDER: ICD-10-CM

## 2024-11-26 DIAGNOSIS — I73.9 PERIPHERAL VASCULAR DISEASE WITH CLAUDICATION (CMS-HCC): ICD-10-CM

## 2024-11-26 DIAGNOSIS — I10 ESSENTIAL HYPERTENSION: Primary | ICD-10-CM

## 2024-11-26 DIAGNOSIS — E78.2 MIXED HYPERLIPIDEMIA: ICD-10-CM

## 2024-11-26 DIAGNOSIS — R05.3 CHRONIC COUGH: ICD-10-CM

## 2024-11-26 DIAGNOSIS — M53.9 MULTILEVEL DEGENERATIVE DISC DISEASE: ICD-10-CM

## 2024-11-26 DIAGNOSIS — R73.03 PREDIABETES: ICD-10-CM

## 2024-11-26 DIAGNOSIS — E78.1 ESSENTIAL HYPERTRIGLYCERIDEMIA: ICD-10-CM

## 2024-11-26 DIAGNOSIS — K21.9 GASTROESOPHAGEAL REFLUX DISEASE WITHOUT ESOPHAGITIS: ICD-10-CM

## 2024-11-26 DIAGNOSIS — B00.1 RECURRENT COLD SORES: ICD-10-CM

## 2024-11-26 DIAGNOSIS — L85.3 DRY SKIN DERMATITIS: ICD-10-CM

## 2024-11-26 DIAGNOSIS — J30.9 ALLERGIC RHINITIS, UNSPECIFIED SEASONALITY, UNSPECIFIED TRIGGER: ICD-10-CM

## 2024-11-26 DIAGNOSIS — Z23 ENCOUNTER FOR IMMUNIZATION: ICD-10-CM

## 2024-11-26 PROBLEM — J01.90 ACUTE SINUS INFECTION: Status: RESOLVED | Noted: 2024-08-23 | Resolved: 2024-11-26

## 2024-11-26 PROBLEM — D72.829 LEUKOCYTOSIS: Status: RESOLVED | Noted: 2024-05-29 | Resolved: 2024-11-26

## 2024-11-26 PROBLEM — E87.6 HYPOKALEMIA: Status: RESOLVED | Noted: 2024-05-29 | Resolved: 2024-11-26

## 2024-11-26 PROBLEM — M79.605 BILATERAL LEG PAIN: Status: RESOLVED | Noted: 2024-02-21 | Resolved: 2024-11-26

## 2024-11-26 PROBLEM — R35.0 URINARY FREQUENCY: Status: RESOLVED | Noted: 2023-06-26 | Resolved: 2024-11-26

## 2024-11-26 PROBLEM — M79.604 BILATERAL LEG PAIN: Status: RESOLVED | Noted: 2024-02-21 | Resolved: 2024-11-26

## 2024-11-26 PROCEDURE — G0009 ADMIN PNEUMOCOCCAL VACCINE: HCPCS | Performed by: FAMILY MEDICINE

## 2024-11-26 PROCEDURE — 3078F DIAST BP <80 MM HG: CPT | Performed by: FAMILY MEDICINE

## 2024-11-26 PROCEDURE — G0008 ADMIN INFLUENZA VIRUS VAC: HCPCS | Performed by: FAMILY MEDICINE

## 2024-11-26 PROCEDURE — 99214 OFFICE O/P EST MOD 30 MIN: CPT | Performed by: FAMILY MEDICINE

## 2024-11-26 PROCEDURE — 90662 IIV NO PRSV INCREASED AG IM: CPT | Performed by: FAMILY MEDICINE

## 2024-11-26 PROCEDURE — 1159F MED LIST DOCD IN RCRD: CPT | Performed by: FAMILY MEDICINE

## 2024-11-26 PROCEDURE — 1160F RVW MEDS BY RX/DR IN RCRD: CPT | Performed by: FAMILY MEDICINE

## 2024-11-26 PROCEDURE — G2211 COMPLEX E/M VISIT ADD ON: HCPCS | Performed by: FAMILY MEDICINE

## 2024-11-26 PROCEDURE — 1157F ADVNC CARE PLAN IN RCRD: CPT | Performed by: FAMILY MEDICINE

## 2024-11-26 PROCEDURE — 1036F TOBACCO NON-USER: CPT | Performed by: FAMILY MEDICINE

## 2024-11-26 PROCEDURE — 1123F ACP DISCUSS/DSCN MKR DOCD: CPT | Performed by: FAMILY MEDICINE

## 2024-11-26 PROCEDURE — 90677 PCV20 VACCINE IM: CPT | Performed by: FAMILY MEDICINE

## 2024-11-26 PROCEDURE — 3074F SYST BP LT 130 MM HG: CPT | Performed by: FAMILY MEDICINE

## 2024-11-26 RX ORDER — LORATADINE 10 MG/1
10 TABLET ORAL DAILY
Qty: 90 TABLET | Refills: 1 | Status: SHIPPED | OUTPATIENT
Start: 2024-11-26 | End: 2025-05-25

## 2024-11-26 RX ORDER — MONTELUKAST SODIUM 10 MG/1
10 TABLET ORAL NIGHTLY
Qty: 90 TABLET | Refills: 1 | Status: SHIPPED | OUTPATIENT
Start: 2024-11-26 | End: 2025-05-25

## 2024-11-26 RX ORDER — GUAIFENESIN 600 MG/1
1200 TABLET, EXTENDED RELEASE ORAL 2 TIMES DAILY
Qty: 120 TABLET | Refills: 0 | Status: SHIPPED | OUTPATIENT
Start: 2024-11-26 | End: 2024-12-26

## 2024-11-26 RX ORDER — FLUOCINONIDE 0.5 MG/G
CREAM TOPICAL 2 TIMES DAILY PRN
Qty: 60 G | Refills: 2 | Status: SHIPPED | OUTPATIENT
Start: 2024-11-26 | End: 2025-11-26

## 2024-11-26 RX ORDER — VALACYCLOVIR HYDROCHLORIDE 1 G/1
2000 TABLET, FILM COATED ORAL EVERY 12 HOURS
Qty: 4 TABLET | Refills: 5 | Status: SHIPPED | OUTPATIENT
Start: 2024-11-26

## 2024-11-26 RX ORDER — FAMOTIDINE 20 MG/1
20 TABLET, FILM COATED ORAL 2 TIMES DAILY
Qty: 180 TABLET | Refills: 1 | Status: SHIPPED | OUTPATIENT
Start: 2024-11-26 | End: 2025-05-25

## 2024-11-26 ASSESSMENT — ENCOUNTER SYMPTOMS
WHEEZING: 0
SINUS PRESSURE: 0
PALPITATIONS: 0
SINUS PAIN: 0
LIGHT-HEADEDNESS: 0
ADENOPATHY: 0
SORE THROAT: 0
POLYPHAGIA: 0
FREQUENCY: 0
CONFUSION: 0
NUMBNESS: 0
CONSTIPATION: 0
VOMITING: 0
CHEST TIGHTNESS: 0
COUGH: 0
POLYDIPSIA: 0
ABDOMINAL PAIN: 0
DYSPHORIC MOOD: 0
DYSURIA: 0
NERVOUS/ANXIOUS: 0
CHILLS: 0
DIZZINESS: 0
HEADACHES: 0
UNEXPECTED WEIGHT CHANGE: 0
DIAPHORESIS: 0
HEMATURIA: 0
SHORTNESS OF BREATH: 0
FEVER: 0
DIARRHEA: 0
NAUSEA: 0

## 2024-11-26 ASSESSMENT — PATIENT HEALTH QUESTIONNAIRE - PHQ9
1. LITTLE INTEREST OR PLEASURE IN DOING THINGS: NOT AT ALL
SUM OF ALL RESPONSES TO PHQ9 QUESTIONS 1 AND 2: 0
2. FEELING DOWN, DEPRESSED OR HOPELESS: NOT AT ALL

## 2024-11-26 NOTE — ASSESSMENT & PLAN NOTE
- Controlled  - continue flonase  - continue loratadine 10 mg daily   - montelukast was added at last visit but she is not sure if she is taking it. As she is having daily cough, will start montelukast to see if it helps --> cough may have allergy component

## 2024-11-26 NOTE — ASSESSMENT & PLAN NOTE
- ongoing for the last few months  - differential includes allergies  vs. gastroesophageal reflux disease vs. Lung (asthma, COPD)  - is on pepcid for gastroesophageal reflux disease and does feel that this helps  - is on loratadine and flonase for allergies. Will add montelukast to see if that helps   - check chest x-ray   - start mucinex 600 mg twice daily

## 2024-11-26 NOTE — PATIENT INSTRUCTIONS
Deborah Boston ,    Thank you for coming in today. We at [facility] appreciate your trust in our care. If you have any questions or concerns about the care you received today, please do not hesitate to contact us at [phone].    The following instructions were discussed today:    - START montelukast (singulair) 10 mg daily   - start mucinex 600 mg twice daily  - get chest x-ray   - Follow up in 1 month for virtual visit to discuss cough   - Follow up 2/18/2025 as scheduled.

## 2024-11-26 NOTE — ASSESSMENT & PLAN NOTE
- HbA1c 5.9  - Encouraged healthy lifestyle, including adequate exercise and high fiber, low fat and low carb diet.

## 2024-11-26 NOTE — PROGRESS NOTES
Subjective   Patient ID: Deborah Boston is a 79 y.o. female who presents for lab results (Flu and prevnar due today In office/Patient has a cough she can't get rid of, feels like it's settling in her chest).    HPI   routine follow up. chronic issues as per assessment and plan.     Has cough that has been coming and going over the past few months. She states she will get a lot of mucus production as well and spits it up, and then gets into a coughing spell. The coughing is worse at night. She denies shortness of breath. Was last treated with antibiotic in August 2024 and this was for a sinus infection. She is former smoker but quit around 1978. No history of asthma in childhood    Reviewed 10/7/24 labs --> ; HbA1c 5.9; chol 129; HDL 43.3; LDL 57;     Lab on 10/07/2024   Component Date Value Ref Range Status    Vitamin D, 25-Hydroxy, Total 10/07/2024 54  30 - 100 ng/mL Final    Vitamin B12 10/07/2024 400  211 - 911 pg/mL Final    Hemoglobin A1C 10/07/2024 5.9 (H)  See comment % Final    Estimated Average Glucose 10/07/2024 123  Not Established mg/dL Final    WBC 10/07/2024 5.9  4.4 - 11.3 x10*3/uL Final    nRBC 10/07/2024 0.0  0.0 - 0.0 /100 WBCs Final    RBC 10/07/2024 4.82  4.00 - 5.20 x10*6/uL Final    Hemoglobin 10/07/2024 14.6  12.0 - 16.0 g/dL Final    Hematocrit 10/07/2024 42.5  36.0 - 46.0 % Final    MCV 10/07/2024 88  80 - 100 fL Final    MCH 10/07/2024 30.3  26.0 - 34.0 pg Final    MCHC 10/07/2024 34.4  32.0 - 36.0 g/dL Final    RDW 10/07/2024 11.8  11.5 - 14.5 % Final    Platelets 10/07/2024 219  150 - 450 x10*3/uL Final    Neutrophils % 10/07/2024 53.0  40.0 - 80.0 % Final    Immature Granulocytes %, Automated 10/07/2024 0.3  0.0 - 0.9 % Final    Immature Granulocyte Count (IG) includes promyelocytes, myelocytes and metamyelocytes but does not include bands. Percent differential counts (%) should be interpreted in the context of the absolute cell counts (cells/UL).    Lymphocytes %  10/07/2024 34.8  13.0 - 44.0 % Final    Monocytes % 10/07/2024 8.5  2.0 - 10.0 % Final    Eosinophils % 10/07/2024 2.9  0.0 - 6.0 % Final    Basophils % 10/07/2024 0.5  0.0 - 2.0 % Final    Neutrophils Absolute 10/07/2024 3.11  1.60 - 5.50 x10*3/uL Final    Percent differential counts (%) should be interpreted in the context of the absolute cell counts (cells/uL).    Immature Granulocytes Absolute, Au* 10/07/2024 0.02  0.00 - 0.50 x10*3/uL Final    Lymphocytes Absolute 10/07/2024 2.04  0.80 - 3.00 x10*3/uL Final    Monocytes Absolute 10/07/2024 0.50  0.05 - 0.80 x10*3/uL Final    Eosinophils Absolute 10/07/2024 0.17  0.00 - 0.40 x10*3/uL Final    Basophils Absolute 10/07/2024 0.03  0.00 - 0.10 x10*3/uL Final    Glucose 10/07/2024 105 (H)  74 - 99 mg/dL Final    Sodium 10/07/2024 139  136 - 145 mmol/L Final    Potassium 10/07/2024 3.5  3.5 - 5.3 mmol/L Final    Chloride 10/07/2024 98  98 - 107 mmol/L Final    Bicarbonate 10/07/2024 33 (H)  21 - 32 mmol/L Final    Anion Gap 10/07/2024 12  10 - 20 mmol/L Final    Urea Nitrogen 10/07/2024 16  6 - 23 mg/dL Final    Creatinine 10/07/2024 0.73  0.50 - 1.05 mg/dL Final    eGFR 10/07/2024 84  >60 mL/min/1.73m*2 Final    Calculations of estimated GFR are performed using the 2021 CKD-EPI Study Refit equation without the race variable for the IDMS-Traceable creatinine methods.  https://jasn.asnjournals.org/content/early/2021/09/22/ASN.8427583958    Calcium 10/07/2024 9.8  8.6 - 10.3 mg/dL Final    Albumin 10/07/2024 4.4  3.4 - 5.0 g/dL Final    Alkaline Phosphatase 10/07/2024 59  33 - 136 U/L Final    Total Protein 10/07/2024 6.6  6.4 - 8.2 g/dL Final    AST 10/07/2024 17  9 - 39 U/L Final    Bilirubin, Total 10/07/2024 0.9  0.0 - 1.2 mg/dL Final    ALT 10/07/2024 16  7 - 45 U/L Final    Patients treated with Sulfasalazine may generate falsely decreased results for ALT.    Cholesterol 10/07/2024 129  0 - 199 mg/dL Final          Age      Desirable   Borderline High   High      0-19 Y     0 - 169       170 - 199     >/= 200    20-24 Y     0 - 189       190 - 224     >/= 225         >24 Y     0 - 199       200 - 239     >/= 240   **All ranges are based on fasting samples. Specific   therapeutic targets will vary based on patient-specific   cardiac risk.    Pediatric guidelines reference:Pediatrics 2011, 128(S5).Adult guidelines reference: NCEP ATPIII Guidelines,NEYDA 2001, 258:2266-28    Venipuncture immediately after or during the administration of Metamizole may lead to falsely low results. Testing should be performed immediately prior to Metamizole dosing.    HDL-Cholesterol 10/07/2024 43.3  mg/dL Final      Age       Very Low   Low     Normal    High    0-19 Y    < 35      < 40     40-45     ----  20-24 Y    ----     < 40      >45      ----        >24 Y      ----     < 40     40-60      >60      Cholesterol/HDL Ratio 10/07/2024 3.0   Final      Ref Values  Desirable  < 3.4  High Risk  > 5.0    LDL Calculated 10/07/2024 57  <=99 mg/dL Final                                Near   Borderline      AGE      Desirable  Optimal    High     High     Very High     0-19 Y     0 - 109     ---    110-129   >/= 130     ----    20-24 Y     0 - 119     ---    120-159   >/= 160     ----      >24 Y     0 -  99   100-129  130-159   160-189     >/=190      VLDL 10/07/2024 29  0 - 40 mg/dL Final    Triglycerides 10/07/2024 145  0 - 149 mg/dL Final       Age         Desirable   Borderline High   High     Very High   0 D-90 D    19 - 174         ----         ----        ----  91 D- 9 Y     0 -  74        75 -  99     >/= 100      ----    10-19 Y     0 -  89        90 - 129     >/= 130      ----    20-24 Y     0 - 114       115 - 149     >/= 150      ----         >24 Y     0 - 149       150 - 199    200- 499    >/= 500    Venipuncture immediately after or during the administration of Metamizole may lead to falsely low results. Testing should be performed immediately prior to Metamizole dosing.    Non HDL  Cholesterol 10/07/2024 86  0 - 149 mg/dL Final          Age       Desirable   Borderline High   High     Very High     0-19 Y     0 - 119       120 - 144     >/= 145    >/= 160    20-24 Y     0 - 149       150 - 189     >/= 190      ----         >24 Y    30 mg/dL above LDL Cholesterol goal      Thyroid Stimulating Hormone 10/07/2024 1.89  0.44 - 3.98 mIU/L Final   Admission on 09/27/2024, Discharged on 09/27/2024   Component Date Value Ref Range Status    Sedimentation Rate 09/27/2024 2  0 - 30 mm/h Final    Ventricular Rate 09/27/2024 69  BPM Final    Atrial Rate 09/27/2024 69  BPM Final    PA Interval 09/27/2024 209  ms Final    QRS Duration 09/27/2024 96  ms Final    QT Interval 09/27/2024 388  ms Final    QTC Calculation(Bazett) 09/27/2024 416  ms Final    P Axis 09/27/2024 40  degrees Final    R Axis 09/27/2024 10  degrees Final    T Axis 09/27/2024 0  degrees Final    QRS Count 09/27/2024 11  beats Final    Q Onset 09/27/2024 249  ms Final    T Offset 09/27/2024 443  ms Final    QTC Fredericia 09/27/2024 406  ms Final        Review of Systems   Constitutional:  Negative for chills, diaphoresis, fever and unexpected weight change.   HENT:  Negative for congestion, sinus pressure, sinus pain, sneezing and sore throat.    Respiratory:  Negative for cough, chest tightness, shortness of breath and wheezing.    Cardiovascular:  Negative for chest pain, palpitations and leg swelling.   Gastrointestinal:  Negative for abdominal pain, constipation, diarrhea, nausea and vomiting.   Endocrine: Negative for cold intolerance, heat intolerance, polydipsia, polyphagia and polyuria.   Genitourinary:  Negative for dysuria, frequency, hematuria and urgency.   Neurological:  Negative for dizziness, syncope, light-headedness, numbness and headaches.   Hematological:  Negative for adenopathy.   Psychiatric/Behavioral:  Negative for confusion and dysphoric mood. The patient is not nervous/anxious.        Objective   /78 (BP  "Location: Right arm, Patient Position: Sitting, BP Cuff Size: Large adult)   Pulse 72   Temp 36.7 °C (98 °F)   Resp 16   Ht 1.549 m (5' 1\")   Wt 54 kg (119 lb)   SpO2 98%   BMI 22.48 kg/m²     Physical Exam  Vitals and nursing note reviewed.   Constitutional:       General: She is not in acute distress.     Appearance: Normal appearance.   HENT:      Head: Normocephalic and atraumatic.      Nose: Nose normal.   Eyes:      Extraocular Movements: Extraocular movements intact.      Conjunctiva/sclera: Conjunctivae normal.      Pupils: Pupils are equal, round, and reactive to light.   Cardiovascular:      Rate and Rhythm: Normal rate and regular rhythm.      Heart sounds: No murmur heard.     No friction rub. No gallop.   Pulmonary:      Effort: Pulmonary effort is normal.      Breath sounds: Normal breath sounds. No wheezing, rhonchi or rales.   Abdominal:      General: Bowel sounds are normal. There is no distension.      Palpations: Abdomen is soft.      Tenderness: There is no abdominal tenderness.   Musculoskeletal:         General: Normal range of motion.      Cervical back: Normal range of motion and neck supple.   Skin:     General: Skin is warm and dry.   Neurological:      General: No focal deficit present.      Mental Status: She is alert and oriented to person, place, and time.      Deep Tendon Reflexes: Reflexes normal.   Psychiatric:         Mood and Affect: Mood normal.         Behavior: Behavior normal.         Thought Content: Thought content normal.         Judgment: Judgment normal.         Assessment/Plan   Problem List Items Addressed This Visit             ICD-10-CM    Allergic rhinitis J30.9     - Controlled  - continue flonase  - continue loratadine 10 mg daily   - montelukast was added at last visit but she is not sure if she is taking it. As she is having daily cough, will start montelukast to see if it helps --> cough may have allergy component          Relevant Medications    " loratadine (Claritin) 10 mg tablet    montelukast (Singulair) 10 mg tablet    Chronic cough R05.3     - ongoing for the last few months  - differential includes allergies  vs. gastroesophageal reflux disease vs. Lung (asthma, COPD)  - is on pepcid for gastroesophageal reflux disease and does feel that this helps  - is on loratadine and flonase for allergies. Will add montelukast to see if that helps   - check chest x-ray   - start mucinex 600 mg twice daily          Relevant Medications    guaiFENesin (Mucinex) 600 mg 12 hr tablet    Other Relevant Orders    XR chest 2 views    Dry skin dermatitis L85.3     -fluocinonide as needed         Relevant Medications    fluocinonide (Lidex) 0.05 % cream    Encounter for immunization Z23     - high dose flu shot and PCV 20 administered today          Relevant Orders    Flu vaccine, trivalent, preservative free, HIGH-DOSE, age 65y+ (Fluzone) (Completed)    Pneumococcal conjugate vaccine, 20-valent (PREVNAR 20) (Completed)    Essential hypertension - Primary I10     - controlled  - current regimen: chlorthalidone 50 mg daily, losartan 50 mg twice daily, Toprol XL 50 mg daily          Essential hypertriglyceridemia E78.1     - controlled  -continue atorvastatin           Gastroesophageal reflux disease without esophagitis K21.9     -stable, continue famotidine         Relevant Medications    famotidine (Pepcid) 20 mg tablet    Mixed hyperlipidemia E78.2     - controlled  -continue atorvastatin           Multilevel degenerative disc disease M53.9    Relevant Orders    Disability Placard    Overactive bladder N32.81     - controlled. Continue oxybutynin          Peripheral vascular disease with claudication (CMS-HCC) I73.9     - continue daily aspirin, statin  - recent ABIs negative            Relevant Orders    Disability Placard    Prediabetes R73.03     - HbA1c 5.9  - Encouraged healthy lifestyle, including adequate exercise and high fiber, low fat and low carb diet.           Recurrent cold sores B00.1     -continue valtrex as needed for flare.         Relevant Medications    valACYclovir (Valtrex) 1 gram tablet

## 2024-11-26 NOTE — ASSESSMENT & PLAN NOTE
- controlled  - current regimen: chlorthalidone 50 mg daily, losartan 50 mg twice daily, Toprol XL 50 mg daily

## 2024-11-26 NOTE — TELEPHONE ENCOUNTER
Patient is wondering about a service animal to help with travel as she has a difficult time with walking.

## 2024-11-29 NOTE — TELEPHONE ENCOUNTER
I sent patient a message through [employer] regarding how to go about getting a service dog. Can you call her and make sure she checks her [employer]? I am not sure how much she uses it.

## 2024-11-29 NOTE — TELEPHONE ENCOUNTER
Spoke with patient and advised her that Dr. Maria sent her a message via [employer] in regards to a service dog. Patient is aware and will log into [employer].

## 2024-12-02 ENCOUNTER — HOSPITAL ENCOUNTER (OUTPATIENT)
Dept: RADIOLOGY | Facility: HOSPITAL | Age: 79
Discharge: HOME | End: 2024-12-02
Payer: MEDICARE

## 2024-12-02 DIAGNOSIS — R05.3 CHRONIC COUGH: ICD-10-CM

## 2024-12-02 PROCEDURE — 71046 X-RAY EXAM CHEST 2 VIEWS: CPT

## 2024-12-02 PROCEDURE — 71046 X-RAY EXAM CHEST 2 VIEWS: CPT | Performed by: RADIOLOGY

## 2024-12-06 ENCOUNTER — TELEPHONE (OUTPATIENT)
Dept: PRIMARY CARE | Facility: CLINIC | Age: 79
End: 2024-12-06
Payer: MEDICARE

## 2024-12-06 NOTE — TELEPHONE ENCOUNTER
Patient has been dealing with dry mouth and urinating frequently and urinating a lot when she does urinate. This all started the beginning of the week. Also, when patient wakes up the top of her head does have pressure. She did have a filling that fell out but she isn't sure if that's the cause but this was a month ago and she can't see the dentist until January. She has been drinking lots of water and she's eating normal, no other symptoms. Recommendations?

## 2024-12-07 DIAGNOSIS — I10 ESSENTIAL HYPERTENSION: ICD-10-CM

## 2024-12-09 RX ORDER — CHLORTHALIDONE 50 MG/1
50 TABLET ORAL DAILY
Qty: 100 TABLET | Refills: 2 | OUTPATIENT
Start: 2024-12-09

## 2024-12-09 RX ORDER — METOPROLOL SUCCINATE 50 MG/1
50 TABLET, EXTENDED RELEASE ORAL DAILY
Qty: 100 TABLET | Refills: 2 | OUTPATIENT
Start: 2024-12-09

## 2024-12-11 ENCOUNTER — TELEPHONE (OUTPATIENT)
Dept: PRIMARY CARE | Facility: CLINIC | Age: 79
End: 2024-12-11
Payer: MEDICARE

## 2024-12-11 PROBLEM — H90.3 SENSORINEURAL HEARING LOSS (SNHL) OF BOTH EARS: Status: ACTIVE | Noted: 2024-12-11

## 2024-12-11 NOTE — TELEPHONE ENCOUNTER
Kalee came in to let you know when she went to ENT on 12/9/24, he said there was no blockage but she did have a calcium build up patient pointed to her neck area but was unsure exactly where. She said he requested she make sure she let her primary doctor know. She has also had dry mouth and bad cough for 2 weeks. She was at urgent care Friday 12/6/24 and they gave her a Z Pack but it has not seemed to help. She would like to know if she can have an appointment with you or speak with you.

## 2024-12-11 NOTE — TELEPHONE ENCOUNTER
For documentation --> reviewed Dr. Babin's note from 12/9/25 visit. She saw him for pulsatile tinnitus on left side. Also discussed chronic nasal congestion and rhinorrhea with him. Audiogram and tympanogram done and revealed mild to moderate high frequency sensorineural hearing loss. Started her on flonase and astelin nasal sprays for chronic allergic rhinitis. Her ordered an MRA of the head and neck for the tinnitus.

## 2024-12-11 NOTE — TELEPHONE ENCOUNTER
Called and spoke to Yesenia at [facility] Babin [facility] office and requested notes on Deborah's visit. She is faxing to our office. I spoke with Deborah and scheduled a virtual visit with Dr Maria on 12/13/24 @ 10:15 am to address cough.

## 2024-12-13 ENCOUNTER — TELEMEDICINE (OUTPATIENT)
Dept: PRIMARY CARE | Facility: CLINIC | Age: 79
End: 2024-12-13
Payer: MEDICARE

## 2024-12-13 DIAGNOSIS — J01.90 ACUTE SINUSITIS, RECURRENCE NOT SPECIFIED, UNSPECIFIED LOCATION: Primary | ICD-10-CM

## 2024-12-13 PROCEDURE — 1036F TOBACCO NON-USER: CPT | Performed by: FAMILY MEDICINE

## 2024-12-13 PROCEDURE — 1123F ACP DISCUSS/DSCN MKR DOCD: CPT | Performed by: FAMILY MEDICINE

## 2024-12-13 PROCEDURE — 1160F RVW MEDS BY RX/DR IN RCRD: CPT | Performed by: FAMILY MEDICINE

## 2024-12-13 PROCEDURE — 99442 PR PHYS/QHP TELEPHONE EVALUATION 11-20 MIN: CPT | Performed by: FAMILY MEDICINE

## 2024-12-13 PROCEDURE — 1157F ADVNC CARE PLAN IN RCRD: CPT | Performed by: FAMILY MEDICINE

## 2024-12-13 PROCEDURE — 1159F MED LIST DOCD IN RCRD: CPT | Performed by: FAMILY MEDICINE

## 2024-12-13 ASSESSMENT — ENCOUNTER SYMPTOMS
NAUSEA: 0
SINUS PAIN: 0
DIARRHEA: 0
CONSTIPATION: 0
DYSPHORIC MOOD: 0
NERVOUS/ANXIOUS: 0
POLYPHAGIA: 0
WHEEZING: 0
SINUS PRESSURE: 0
DIAPHORESIS: 0
VOMITING: 0
SORE THROAT: 0
POLYDIPSIA: 0
NUMBNESS: 0
CHEST TIGHTNESS: 0
ABDOMINAL PAIN: 0
DYSURIA: 0
LIGHT-HEADEDNESS: 0
HEMATURIA: 0
CHILLS: 0
COUGH: 0
DIZZINESS: 0
SHORTNESS OF BREATH: 0
PALPITATIONS: 0
HEADACHES: 0
FEVER: 0
ADENOPATHY: 0
CONFUSION: 0
FREQUENCY: 0
UNEXPECTED WEIGHT CHANGE: 0

## 2024-12-13 NOTE — PROGRESS NOTES
Subjective   Patient ID: Deborah Boston is a 79 y.o. female who presents for No chief complaint on file..    Virtual or Telephone Consent    A telephone visit (audio only) between the patient (at the originating site) and the provider (at the distant site) was utilized to provide this telehealth service.   Verbal consent was requested and obtained from Deborah Boston on this date, 12/13/24 for a telehealth visit.      HPI  Acute visit for cough and congestion. She was having issues with her sinuses and went to urgent care about one week ago. Was given z-alisson. She states she is feeling better. Cough has improved.     Review of Systems   Constitutional:  Negative for chills, diaphoresis, fever and unexpected weight change.   HENT:  Negative for congestion, sinus pressure, sinus pain, sneezing and sore throat.    Respiratory:  Negative for cough, chest tightness, shortness of breath and wheezing.    Cardiovascular:  Negative for chest pain, palpitations and leg swelling.   Gastrointestinal:  Negative for abdominal pain, constipation, diarrhea, nausea and vomiting.   Endocrine: Negative for cold intolerance, heat intolerance, polydipsia, polyphagia and polyuria.   Genitourinary:  Negative for dysuria, frequency, hematuria and urgency.   Neurological:  Negative for dizziness, syncope, light-headedness, numbness and headaches.   Hematological:  Negative for adenopathy.   Psychiatric/Behavioral:  Negative for confusion and dysphoric mood. The patient is not nervous/anxious.          Assessment/Plan   Problem List Items Addressed This Visit       Acute sinus infection - Primary     - improved             This telephone visit lasted approximately 12 minutes.

## 2024-12-23 ENCOUNTER — HOSPITAL ENCOUNTER (OUTPATIENT)
Dept: RADIOLOGY | Facility: HOSPITAL | Age: 79
Discharge: HOME | End: 2024-12-23
Payer: MEDICARE

## 2024-12-23 DIAGNOSIS — I67.89 OTHER CEREBROVASCULAR DISEASE: ICD-10-CM

## 2024-12-23 PROCEDURE — 70547 MR ANGIOGRAPHY NECK W/O DYE: CPT

## 2024-12-23 PROCEDURE — 70547 MR ANGIOGRAPHY NECK W/O DYE: CPT | Performed by: RADIOLOGY

## 2024-12-24 DIAGNOSIS — I10 ESSENTIAL HYPERTENSION: ICD-10-CM

## 2024-12-26 RX ORDER — METOPROLOL SUCCINATE 50 MG/1
50 TABLET, EXTENDED RELEASE ORAL DAILY
Qty: 100 TABLET | Refills: 2 | OUTPATIENT
Start: 2024-12-26

## 2024-12-26 RX ORDER — CHLORTHALIDONE 50 MG/1
50 TABLET ORAL DAILY
Qty: 100 TABLET | Refills: 2 | OUTPATIENT
Start: 2024-12-26

## 2024-12-27 ENCOUNTER — APPOINTMENT (OUTPATIENT)
Dept: PRIMARY CARE | Facility: CLINIC | Age: 79
End: 2024-12-27
Payer: MEDICARE

## 2024-12-27 ENCOUNTER — TELEPHONE (OUTPATIENT)
Dept: PRIMARY CARE | Facility: CLINIC | Age: 79
End: 2024-12-27

## 2024-12-27 DIAGNOSIS — R05.3 CHRONIC COUGH: Primary | ICD-10-CM

## 2024-12-27 DIAGNOSIS — I77.1 TORTUOUS ARTERY (CMS-HCC): ICD-10-CM

## 2024-12-27 PROCEDURE — 1159F MED LIST DOCD IN RCRD: CPT | Performed by: FAMILY MEDICINE

## 2024-12-27 PROCEDURE — 1036F TOBACCO NON-USER: CPT | Performed by: FAMILY MEDICINE

## 2024-12-27 PROCEDURE — 99442 PR PHYS/QHP TELEPHONE EVALUATION 11-20 MIN: CPT | Performed by: FAMILY MEDICINE

## 2024-12-27 PROCEDURE — 1123F ACP DISCUSS/DSCN MKR DOCD: CPT | Performed by: FAMILY MEDICINE

## 2024-12-27 PROCEDURE — 1157F ADVNC CARE PLAN IN RCRD: CPT | Performed by: FAMILY MEDICINE

## 2024-12-27 PROCEDURE — 1160F RVW MEDS BY RX/DR IN RCRD: CPT | Performed by: FAMILY MEDICINE

## 2024-12-27 RX ORDER — ALBUTEROL SULFATE AND BUDESONIDE 90; 80 UG/1; UG/1
2 AEROSOL, METERED RESPIRATORY (INHALATION) EVERY 4 HOURS PRN
Qty: 10.7 G | Refills: 0 | Status: SHIPPED | OUTPATIENT
Start: 2024-12-27 | End: 2025-03-27

## 2024-12-27 RX ORDER — AZELASTINE 1 MG/ML
1 SPRAY, METERED NASAL 2 TIMES DAILY
COMMUNITY
Start: 2024-12-09

## 2024-12-27 ASSESSMENT — ENCOUNTER SYMPTOMS
HEMATURIA: 0
VOMITING: 0
POLYPHAGIA: 0
ADENOPATHY: 0
DIAPHORESIS: 0
POLYDIPSIA: 0
DIZZINESS: 0
CONSTIPATION: 0
LIGHT-HEADEDNESS: 0
HEADACHES: 0
DYSURIA: 0
ABDOMINAL PAIN: 0
COUGH: 1
NERVOUS/ANXIOUS: 0
SINUS PAIN: 0
PALPITATIONS: 0
SORE THROAT: 0
FEVER: 0
DYSPHORIC MOOD: 0
SINUS PRESSURE: 0
DIARRHEA: 0
SHORTNESS OF BREATH: 0
FREQUENCY: 0
CONFUSION: 0
NUMBNESS: 0
CHILLS: 0
CHEST TIGHTNESS: 0
WHEEZING: 0
UNEXPECTED WEIGHT CHANGE: 0
NAUSEA: 0

## 2024-12-27 NOTE — PATIENT INSTRUCTIONS
Deborah Boston ,    Thank you for coming in today. We at [facility] appreciate your trust in our care. If you have any questions or concerns about the care you received today, please do not hesitate to contact us at [phone].    The following instructions were discussed today:    - check PFTs (breathing tests)  - start airsupra as needed --> 2 puffs every 4 hours as needed for cough, wheeze, shortness of breath   - Follow up 2/18/2025 as scheduled.

## 2024-12-27 NOTE — ASSESSMENT & PLAN NOTE
- ongoing for the last four months or so  - differential includes allergies  vs. gastroesophageal reflux disease vs. Lung (asthma, COPD)  - is on pepcid for gastroesophageal reflux disease and does feel that this helps  - is on loratadine and flonase for allergies. Added montelukast at last visit but she states she never got it. Will send again  - 12/2/24 chest x-ray --> hyperinflated lungs but was otherwise normal  - check PFTs  - start airsupra as needed

## 2024-12-27 NOTE — ASSESSMENT & PLAN NOTE
- 12/23/24 MRA head/ neck  --> luminal irregularity, ectasia, and beading of the mid to distal cervical ICAs bilaterally and to a lesser extent possibly  involving the distal V2 and V3 segments of the right vertebral artery. These findings are nonspecific but may be seen with FMD. There is no  significant stenosis within the limitations of this examination.  - refer to vascular

## 2024-12-27 NOTE — PROGRESS NOTES
Subjective   Patient ID: Deborah Boston is a 79 y.o. female who presents for Follow-up.    Virtual or Telephone Consent    A telephone visit (audio only) between the patient (at the originating site) and the provider (at the distant site) was utilized to provide this telehealth service.   Verbal consent was requested and obtained from Deborah Boston on this date, 12/27/24 for a telehealth visit.      HPI  Follow up on chronic cough. Ongoing for several months (about 4 months). She is on flonase and loratadine. I added singulair at last visit but she states she never got it. She is on pepcid for gastroesophageal reflux disease. chest x-ray done 12/2/24 showed hyperinflated lungs but was otherwise normal. She is a former smoker, quit in 1978. States she was never really a heavy smoker, just smoked every once and awhile.     Saw ENT, Dr. Babin, on 12/9/24 for pulsatile tinnitus on the left side. Also discussed chronic nasal congestion and rhinorrhea with him. Note reviewed. Audiogram and tympanogram done and revealed mild to moderate high frequency sensorineural hearing loss. Started her on flonase and astelin nasal sprays for chronic allergic rhinitis. Her ordered an MRA of the head and neck for the tinnitus. This did not show significant stenosis but did show luminal irregularity, ectasia, and beading of the mid to distal cervical ICAs bilaterally and to a lesser extent possibly involving the distal V2 and V3 segments of the right vertebral artery    Review of Systems   Constitutional:  Negative for chills, diaphoresis, fever and unexpected weight change.   HENT:  Negative for congestion, sinus pressure, sinus pain, sneezing and sore throat.    Respiratory:  Positive for cough. Negative for chest tightness, shortness of breath and wheezing.    Cardiovascular:  Negative for chest pain, palpitations and leg swelling.   Gastrointestinal:  Negative for abdominal pain, constipation, diarrhea, nausea and vomiting.    Endocrine: Negative for cold intolerance, heat intolerance, polydipsia, polyphagia and polyuria.   Genitourinary:  Negative for dysuria, frequency, hematuria and urgency.   Neurological:  Negative for dizziness, syncope, light-headedness, numbness and headaches.   Hematological:  Negative for adenopathy.   Psychiatric/Behavioral:  Negative for confusion and dysphoric mood. The patient is not nervous/anxious.          Assessment/Plan   Problem List Items Addressed This Visit       Chronic cough - Primary     - ongoing for the last four months or so  - differential includes allergies  vs. gastroesophageal reflux disease vs. Lung (asthma, COPD)  - is on pepcid for gastroesophageal reflux disease and does feel that this helps  - is on loratadine and flonase for allergies. Added montelukast at last visit but she states she never got it. Will send again  - 12/2/24 chest x-ray --> hyperinflated lungs but was otherwise normal  - check PFTs  - start airsupra as needed          Relevant Medications    albuterol-budesonide (Airsupra) 90-80 mcg/actuation inhaler    Other Relevant Orders    Complete Pulmonary Function Test Pre/Post Bronchodilator (Spirometry Pre/Post/DLCO/Lung Volumes)    Tortuous artery (CMS-[facility])     - 12/23/24 MRA head/ neck  --> luminal irregularity, ectasia, and beading of the mid to distal cervical ICAs bilaterally and to a lesser extent possibly  involving the distal V2 and V3 segments of the right vertebral artery. These findings are nonspecific but may be seen with FMD. There is no  significant stenosis within the limitations of this examination.  - refer to vascular          Relevant Orders    Referral to Vascular Surgery       This telephone visit lasted approximately 12 minutes.

## 2025-01-02 ENCOUNTER — TELEPHONE (OUTPATIENT)
Dept: PRIMARY CARE | Facility: CLINIC | Age: 80
End: 2025-01-02
Payer: MEDICARE

## 2025-01-02 DIAGNOSIS — R05.3 CHRONIC COUGH: Primary | ICD-10-CM

## 2025-01-02 RX ORDER — ALBUTEROL SULFATE 90 UG/1
2 INHALANT RESPIRATORY (INHALATION) EVERY 6 HOURS PRN
Qty: 24 G | Refills: 1 | Status: SHIPPED | OUTPATIENT
Start: 2025-01-02 | End: 2025-07-01

## 2025-01-02 NOTE — TELEPHONE ENCOUNTER
please let patient know  the airsupra inhaler is not covered by her insurance. Change to albuterol inhaler. Instructions --> 2 puffs four times daily as needed for cough, wheeze, shortness of breath

## 2025-01-07 DIAGNOSIS — R05.3 CHRONIC COUGH: ICD-10-CM

## 2025-01-07 RX ORDER — ALBUTEROL SULFATE 90 UG/1
INHALANT RESPIRATORY (INHALATION)
Qty: 36 G | Refills: 6 | Status: SHIPPED | OUTPATIENT
Start: 2025-01-07

## 2025-01-14 NOTE — TELEPHONE ENCOUNTER
Both appts were made via central scheduling after patient's appt with Dr. Maria two weeks ago. Encounter was never closed out.   
Laina virtual with patient. She needs:    - PFTs  - vascular referral   
Unknown

## 2025-01-23 ENCOUNTER — HOSPITAL ENCOUNTER (OUTPATIENT)
Dept: RESPIRATORY THERAPY | Facility: HOSPITAL | Age: 80
Discharge: HOME | End: 2025-01-23
Payer: MEDICARE

## 2025-01-23 DIAGNOSIS — R05.3 CHRONIC COUGH: ICD-10-CM

## 2025-01-23 LAB
MGC ASCENT PFT - FEV1 - POST: 2.06
MGC ASCENT PFT - FEV1 - PRE: 1.88
MGC ASCENT PFT - FEV1 - PREDICTED: 1.74
MGC ASCENT PFT - FVC - POST: 2.85
MGC ASCENT PFT - FVC - PRE: 2.74
MGC ASCENT PFT - FVC - PREDICTED: 2.26

## 2025-01-23 PROCEDURE — 2500000001 HC RX 250 WO HCPCS SELF ADMINISTERED DRUGS (ALT 637 FOR MEDICARE OP): Performed by: FAMILY MEDICINE

## 2025-01-23 PROCEDURE — 94729 DIFFUSING CAPACITY: CPT

## 2025-01-23 PROCEDURE — 94640 AIRWAY INHALATION TREATMENT: CPT

## 2025-01-23 RX ORDER — ALBUTEROL SULFATE 90 UG/1
4 INHALANT RESPIRATORY (INHALATION) ONCE
Status: COMPLETED | OUTPATIENT
Start: 2025-01-23 | End: 2025-01-23

## 2025-01-23 RX ADMIN — ALBUTEROL SULFATE 4 PUFF: 90 AEROSOL, METERED RESPIRATORY (INHALATION) at 13:29

## 2025-01-30 ENCOUNTER — OFFICE VISIT (OUTPATIENT)
Dept: VASCULAR SURGERY | Facility: HOSPITAL | Age: 80
End: 2025-01-30
Payer: MEDICARE

## 2025-01-30 VITALS
WEIGHT: 116 LBS | BODY MASS INDEX: 21.92 KG/M2 | HEART RATE: 69 BPM | SYSTOLIC BLOOD PRESSURE: 133 MMHG | DIASTOLIC BLOOD PRESSURE: 79 MMHG

## 2025-01-30 DIAGNOSIS — I77.1 TORTUOUS ARTERY (CMS-HCC): Primary | ICD-10-CM

## 2025-01-30 PROCEDURE — 3078F DIAST BP <80 MM HG: CPT | Performed by: SURGERY

## 2025-01-30 PROCEDURE — 3075F SYST BP GE 130 - 139MM HG: CPT | Performed by: SURGERY

## 2025-01-30 PROCEDURE — 99213 OFFICE O/P EST LOW 20 MIN: CPT | Performed by: SURGERY

## 2025-01-30 PROCEDURE — 1036F TOBACCO NON-USER: CPT | Performed by: SURGERY

## 2025-01-30 PROCEDURE — 99203 OFFICE O/P NEW LOW 30 MIN: CPT | Performed by: SURGERY

## 2025-01-30 PROCEDURE — 1123F ACP DISCUSS/DSCN MKR DOCD: CPT | Performed by: SURGERY

## 2025-01-30 PROCEDURE — 1157F ADVNC CARE PLAN IN RCRD: CPT | Performed by: SURGERY

## 2025-01-30 NOTE — PROGRESS NOTES
Subjective   Patient ID: Deborah Boston is a 79 y.o. female who presents for New Patient Visit (NPV- Tortuous Artery /Ref from Dr. Maria ).  HPI  It appears patient had evidence of pulsatile tinnitus for 2 or 3 weeks and subsequently at this time completely resolved  No evidence of headache no cephalgia no evidence of amaurosis hemiplegia or hemiparesis  Patient today feels well without complaints concerns or questions    Review of Systems  Review of Systems    Constitutional:  no generalized malaise overall feels well, energy levels intact, no complaints specifically noted  HEENT:  No blurry vision, no visual aides noted, no hearing loss no ear ache no nose bleeds noted, no dysphagia, no congestion otherwise no pertinent positives noted  Cardiovascular:  no palpitations, chest pain or heaviness noted, no leg swelling, no numbness or tingling in the lower extremity noted  Respiratory:  no shortness of breath, no productive cough noted, no conversation dyspnea or difficulty breathing  Gastrointestinal:  no abdominal pain, no nausea or vomiting, appetite intact, no bowel irregularities noted  Genitourinary:   no urinary incontinence, frequency or urgency issues noted, no hematuria or burning sensation issues  Musculoskeletal:  No muscle aches or pains, no joint discomfort noted, no back pain noted otherwise feels well  Skin: no ulcerations, skin color issues or wounds upper or lower extremities  Neurologic: no dizziness, no hemiplegia, no hemiparesis, no obvious visual deficits noted  Psychiatric: no depression, no memory loss noted, no suicidal ideation  Endocrine: no weight loss or gain, no temperature concerns hot or cold intolerance  Hemogolotic/Lymphatic: no bruising, excessive bleeding, no swelling in the groins or neck noted    Objective   Physical Exam  Physical exam    Constitutional: alert and in no acute distress verbal  Eyes: No erythema swelling or discharge noted  Neck: supple, symmetric, trachea  midline, no masses noted  Cardiovascular: Carotid pulses 2+, no obvious bruit, no Jugular distension noted, no thrill, heart regular rate, lower extremity vascular exam intact, cap refill <2 sec  Pulmonary:  Bilateral breath sounds intact, clear with rales rhonchi or wheeze  Abdomen: soft non tender, no pulsatile masses noted, no rebound rigidity or guarding noted  Skin: intact warm no abnormal turgor  Psychiatric: alert without any obvious cognitive issues, oriented to person, place, and time    Assessment/Plan   Pulsatile tinnitus  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         Carlos Matthews DO 01/30/25 3:02 PM

## 2025-02-03 DIAGNOSIS — K21.9 GASTROESOPHAGEAL REFLUX DISEASE WITHOUT ESOPHAGITIS: ICD-10-CM

## 2025-02-03 DIAGNOSIS — J30.9 ALLERGIC RHINITIS, UNSPECIFIED SEASONALITY, UNSPECIFIED TRIGGER: ICD-10-CM

## 2025-02-03 RX ORDER — FAMOTIDINE 20 MG/1
20 TABLET, FILM COATED ORAL 2 TIMES DAILY
Qty: 200 TABLET | Refills: 2 | OUTPATIENT
Start: 2025-02-03

## 2025-02-03 RX ORDER — MONTELUKAST SODIUM 10 MG/1
10 TABLET ORAL NIGHTLY
Qty: 100 TABLET | Refills: 2 | OUTPATIENT
Start: 2025-02-03

## 2025-02-18 ENCOUNTER — APPOINTMENT (OUTPATIENT)
Dept: PRIMARY CARE | Facility: CLINIC | Age: 80
End: 2025-02-18
Payer: MEDICARE

## 2025-02-22 DIAGNOSIS — I10 ESSENTIAL HYPERTENSION: ICD-10-CM

## 2025-02-24 RX ORDER — CHLORTHALIDONE 50 MG/1
50 TABLET ORAL DAILY
Qty: 80 TABLET | Refills: 3 | OUTPATIENT
Start: 2025-02-24

## 2025-02-24 RX ORDER — METOPROLOL SUCCINATE 50 MG/1
50 TABLET, EXTENDED RELEASE ORAL DAILY
Qty: 80 TABLET | Refills: 3 | OUTPATIENT
Start: 2025-02-24

## 2025-04-08 DIAGNOSIS — E78.2 MIXED HYPERLIPIDEMIA: ICD-10-CM

## 2025-04-08 RX ORDER — ATORVASTATIN CALCIUM 40 MG/1
40 TABLET, FILM COATED ORAL DAILY
Qty: 100 TABLET | Refills: 2 | OUTPATIENT
Start: 2025-04-08

## 2025-05-27 ENCOUNTER — APPOINTMENT (OUTPATIENT)
Dept: PRIMARY CARE | Facility: CLINIC | Age: 80
End: 2025-05-27
Payer: MEDICARE

## 2025-05-27 VITALS
WEIGHT: 117 LBS | HEIGHT: 61 IN | RESPIRATION RATE: 16 BRPM | BODY MASS INDEX: 22.09 KG/M2 | SYSTOLIC BLOOD PRESSURE: 134 MMHG | OXYGEN SATURATION: 98 % | DIASTOLIC BLOOD PRESSURE: 79 MMHG | HEART RATE: 61 BPM

## 2025-05-27 DIAGNOSIS — K21.9 GASTROESOPHAGEAL REFLUX DISEASE WITHOUT ESOPHAGITIS: ICD-10-CM

## 2025-05-27 DIAGNOSIS — F41.9 ANXIETY: ICD-10-CM

## 2025-05-27 DIAGNOSIS — M19.90 ARTHRITIS: ICD-10-CM

## 2025-05-27 DIAGNOSIS — Z00.00 MEDICARE ANNUAL WELLNESS VISIT, SUBSEQUENT: Primary | ICD-10-CM

## 2025-05-27 DIAGNOSIS — I10 ESSENTIAL HYPERTENSION: ICD-10-CM

## 2025-05-27 DIAGNOSIS — I77.1 TORTUOUS ARTERY: ICD-10-CM

## 2025-05-27 DIAGNOSIS — E78.1 ESSENTIAL HYPERTRIGLYCERIDEMIA: ICD-10-CM

## 2025-05-27 DIAGNOSIS — N32.81 OVERACTIVE BLADDER: ICD-10-CM

## 2025-05-27 DIAGNOSIS — E55.9 VITAMIN D DEFICIENCY: ICD-10-CM

## 2025-05-27 DIAGNOSIS — Z23 ENCOUNTER FOR IMMUNIZATION: ICD-10-CM

## 2025-05-27 DIAGNOSIS — Z78.0 POSTMENOPAUSAL: ICD-10-CM

## 2025-05-27 DIAGNOSIS — R73.03 PREDIABETES: ICD-10-CM

## 2025-05-27 DIAGNOSIS — M53.9 MULTILEVEL DEGENERATIVE DISC DISEASE: ICD-10-CM

## 2025-05-27 DIAGNOSIS — E78.2 MIXED HYPERLIPIDEMIA: ICD-10-CM

## 2025-05-27 DIAGNOSIS — J30.9 CHRONIC ALLERGIC RHINITIS: ICD-10-CM

## 2025-05-27 DIAGNOSIS — K58.2 IRRITABLE BOWEL SYNDROME WITH BOTH CONSTIPATION AND DIARRHEA: ICD-10-CM

## 2025-05-27 PROBLEM — R05.3 CHRONIC COUGH: Status: RESOLVED | Noted: 2024-11-26 | Resolved: 2025-05-27

## 2025-05-27 PROBLEM — J01.90 ACUTE SINUS INFECTION: Status: RESOLVED | Noted: 2024-08-23 | Resolved: 2025-05-27

## 2025-05-27 PROBLEM — I73.9 PERIPHERAL VASCULAR DISEASE WITH CLAUDICATION: Status: RESOLVED | Noted: 2023-03-05 | Resolved: 2025-05-27

## 2025-05-27 PROCEDURE — G0439 PPPS, SUBSEQ VISIT: HCPCS | Performed by: FAMILY MEDICINE

## 2025-05-27 PROCEDURE — 1036F TOBACCO NON-USER: CPT | Performed by: FAMILY MEDICINE

## 2025-05-27 PROCEDURE — 1159F MED LIST DOCD IN RCRD: CPT | Performed by: FAMILY MEDICINE

## 2025-05-27 PROCEDURE — 1160F RVW MEDS BY RX/DR IN RCRD: CPT | Performed by: FAMILY MEDICINE

## 2025-05-27 PROCEDURE — 99214 OFFICE O/P EST MOD 30 MIN: CPT | Performed by: FAMILY MEDICINE

## 2025-05-27 PROCEDURE — 3075F SYST BP GE 130 - 139MM HG: CPT | Performed by: FAMILY MEDICINE

## 2025-05-27 PROCEDURE — 1170F FXNL STATUS ASSESSED: CPT | Performed by: FAMILY MEDICINE

## 2025-05-27 PROCEDURE — 3078F DIAST BP <80 MM HG: CPT | Performed by: FAMILY MEDICINE

## 2025-05-27 RX ORDER — METOPROLOL SUCCINATE 50 MG/1
50 TABLET, EXTENDED RELEASE ORAL DAILY
Qty: 100 TABLET | Refills: 3 | Status: SHIPPED | OUTPATIENT
Start: 2025-05-27 | End: 2026-07-01

## 2025-05-27 RX ORDER — ATORVASTATIN CALCIUM 40 MG/1
40 TABLET, FILM COATED ORAL DAILY
Qty: 100 TABLET | Refills: 3 | Status: SHIPPED | OUTPATIENT
Start: 2025-05-27 | End: 2026-07-01

## 2025-05-27 RX ORDER — OXYBUTYNIN CHLORIDE 5 MG/1
5 TABLET, EXTENDED RELEASE ORAL DAILY
Qty: 100 TABLET | Refills: 3 | Status: SHIPPED | OUTPATIENT
Start: 2025-05-27 | End: 2026-07-01

## 2025-05-27 RX ORDER — CHLORTHALIDONE 25 MG/1
25 TABLET ORAL DAILY
Qty: 100 TABLET | Refills: 3 | Status: SHIPPED | OUTPATIENT
Start: 2025-05-27 | End: 2026-07-01

## 2025-05-27 RX ORDER — LOSARTAN POTASSIUM 50 MG/1
50 TABLET ORAL 2 TIMES DAILY
Qty: 200 TABLET | Refills: 3 | Status: SHIPPED | OUTPATIENT
Start: 2025-05-27 | End: 2026-07-01

## 2025-05-27 RX ORDER — MONTELUKAST SODIUM 10 MG/1
10 TABLET ORAL NIGHTLY
Qty: 100 TABLET | Refills: 3 | Status: SHIPPED | OUTPATIENT
Start: 2025-05-27 | End: 2026-07-01

## 2025-05-27 ASSESSMENT — ENCOUNTER SYMPTOMS
COUGH: 0
SORE THROAT: 0
WHEEZING: 0
ABDOMINAL PAIN: 0
CONFUSION: 0
FREQUENCY: 0
SINUS PRESSURE: 0
PALPITATIONS: 0
CHILLS: 0
VOMITING: 0
POLYDIPSIA: 0
NERVOUS/ANXIOUS: 0
LIGHT-HEADEDNESS: 0
DYSPHORIC MOOD: 0
DIZZINESS: 0
POLYPHAGIA: 0
FEVER: 0
DIARRHEA: 0
SHORTNESS OF BREATH: 0
CONSTIPATION: 0
CHEST TIGHTNESS: 0
HEMATURIA: 0
UNEXPECTED WEIGHT CHANGE: 0
HEADACHES: 0
ADENOPATHY: 0
NAUSEA: 0
NUMBNESS: 0
DIAPHORESIS: 0
SINUS PAIN: 0
DYSURIA: 0

## 2025-05-27 ASSESSMENT — PATIENT HEALTH QUESTIONNAIRE - PHQ9
SUM OF ALL RESPONSES TO PHQ9 QUESTIONS 1 & 2: 0
2. FEELING DOWN, DEPRESSED OR HOPELESS: NOT AT ALL
1. LITTLE INTEREST OR PLEASURE IN DOING THINGS: NOT AT ALL

## 2025-05-27 ASSESSMENT — ACTIVITIES OF DAILY LIVING (ADL)
TAKING_MEDICATION: INDEPENDENT
DRESSING: INDEPENDENT
DOING_HOUSEWORK: INDEPENDENT
GROCERY_SHOPPING: INDEPENDENT
MANAGING_FINANCES: INDEPENDENT
BATHING: INDEPENDENT

## 2025-05-27 NOTE — ASSESSMENT & PLAN NOTE
Orders:    CBC and Auto Differential; Future    Comprehensive Metabolic Panel; Future    TSH with reflex to Free T4 if abnormal; Future    Vitamin D 25-Hydroxy,Total (for eval of Vitamin D levels); Future    Vitamin B12; Future

## 2025-05-27 NOTE — PATIENT INSTRUCTIONS
Deborah Boston ,    Thank you for coming in today. We at [facility] appreciate your trust in our care. If you have any questions or concerns about the care you received today, please do not hesitate to contact us at [phone].    The following instructions were discussed today:    - get DEXA (bone density) scan  - Follow up in 6 months   - Please get blood work done 1-2 weeks prior to your next visit. For blood work: Nothing to eat or drink for at least 10 hours prior. Okay for water or black coffee.   - recommended the following vaccines at the pharmacy: RSV, COVID-19

## 2025-05-27 NOTE — ASSESSMENT & PLAN NOTE
- controlled  -continue atorvastatin      Orders:    atorvastatin (Lipitor) 40 mg tablet; Take 1 tablet (40 mg) by mouth once daily.    CBC and Auto Differential; Future    Comprehensive Metabolic Panel; Future    Lipid Panel; Future    TSH with reflex to Free T4 if abnormal; Future    Vitamin B12; Future

## 2025-05-27 NOTE — ASSESSMENT & PLAN NOTE
-feels it is manageable at this point without medication. Will continue to monitor    Orders:    CBC and Auto Differential; Future    Comprehensive Metabolic Panel; Future    TSH with reflex to Free T4 if abnormal; Future    Vitamin B12; Future

## 2025-05-27 NOTE — ASSESSMENT & PLAN NOTE
- controlled  -continue atorvastatin      Orders:    CBC and Auto Differential; Future    Comprehensive Metabolic Panel; Future    Lipid Panel; Future    TSH with reflex to Free T4 if abnormal; Future    Vitamin B12; Future

## 2025-05-27 NOTE — ASSESSMENT & PLAN NOTE
- recommended the following vaccines at the pharmacy: RSV, COVID-19    Orders:    CBC and Auto Differential; Future    Comprehensive Metabolic Panel; Future    TSH with reflex to Free T4 if abnormal; Future    Vitamin B12; Future

## 2025-05-27 NOTE — ASSESSMENT & PLAN NOTE
- HbA1c 5.9  - Encouraged healthy lifestyle, including adequate exercise and high fiber, low fat and low carb diet.     Orders:    CBC and Auto Differential; Future    Comprehensive Metabolic Panel; Future    TSH with reflex to Free T4 if abnormal; Future    Hemoglobin A1C; Future    Vitamin B12; Future

## 2025-05-27 NOTE — ASSESSMENT & PLAN NOTE
-continue acetaminophen as needed    Orders:    CBC and Auto Differential; Future    Comprehensive Metabolic Panel; Future    TSH with reflex to Free T4 if abnormal; Future    Vitamin B12; Future

## 2025-05-27 NOTE — ASSESSMENT & PLAN NOTE
-continue acetaminophen as needed.    Orders:    CBC and Auto Differential; Future    Comprehensive Metabolic Panel; Future    TSH with reflex to Free T4 if abnormal; Future    Vitamin B12; Future

## 2025-05-27 NOTE — PROGRESS NOTES
"Subjective   Reason for Visit: Deborah Boston is an 79 y.o. female here for a Medicare Wellness visit.     Past Medical, Surgical, and Family History reviewed and updated in chart.    Reviewed all medications by prescribing practitioner or clinical pharmacist (such as prescriptions, OTCs, herbal therapies and supplements) and documented in the medical record.    HPI  Here for Medicare Wellness. Also here for routine follow up. chronic issues as per assessment and plan.      Patient Care Team:  Surekha Maria MD as PCP - General  Surekha Maria MD as PCP - [facility] [employer] [facility]     Review of Systems   Constitutional:  Negative for chills, diaphoresis, fever and unexpected weight change.   HENT:  Negative for congestion, sinus pressure, sinus pain, sneezing and sore throat.    Respiratory:  Negative for cough, chest tightness, shortness of breath and wheezing.    Cardiovascular:  Negative for chest pain, palpitations and leg swelling.   Gastrointestinal:  Negative for abdominal pain, constipation, diarrhea, nausea and vomiting.   Endocrine: Negative for cold intolerance, heat intolerance, polydipsia, polyphagia and polyuria.   Genitourinary:  Negative for dysuria, frequency, hematuria and urgency.   Neurological:  Negative for dizziness, syncope, light-headedness, numbness and headaches.   Hematological:  Negative for adenopathy.   Psychiatric/Behavioral:  Negative for confusion and dysphoric mood. The patient is not nervous/anxious.        Objective   Vitals:  /79 (BP Location: Right arm, Patient Position: Sitting, BP Cuff Size: Adult)   Pulse 61   Resp 16   Ht 1.549 m (5' 1\")   Wt 53.1 kg (117 lb)   SpO2 98%   BMI 22.11 kg/m²       Physical Exam  Vitals and nursing note reviewed.   Constitutional:       General: She is not in acute distress.     Appearance: Normal appearance.   HENT:      Head: Normocephalic and atraumatic.      Nose: Nose normal.   Eyes:      Extraocular Movements: Extraocular " movements intact.      Conjunctiva/sclera: Conjunctivae normal.      Pupils: Pupils are equal, round, and reactive to light.   Cardiovascular:      Rate and Rhythm: Normal rate and regular rhythm.      Heart sounds: No murmur heard.     No friction rub. No gallop.   Pulmonary:      Effort: Pulmonary effort is normal.      Breath sounds: Normal breath sounds. No wheezing, rhonchi or rales.   Abdominal:      General: Bowel sounds are normal. There is no distension.      Palpations: Abdomen is soft.      Tenderness: There is no abdominal tenderness.   Musculoskeletal:         General: Normal range of motion.      Cervical back: Normal range of motion and neck supple.   Skin:     General: Skin is warm and dry.   Neurological:      General: No focal deficit present.      Mental Status: She is alert and oriented to person, place, and time.      Deep Tendon Reflexes: Reflexes normal.   Psychiatric:         Mood and Affect: Mood normal.         Behavior: Behavior normal.         Thought Content: Thought content normal.         Judgment: Judgment normal.       Assessment & Plan  Medicare annual wellness visit, subsequent         Mixed hyperlipidemia  - controlled  -continue atorvastatin      Orders:  •  atorvastatin (Lipitor) 40 mg tablet; Take 1 tablet (40 mg) by mouth once daily.  •  CBC and Auto Differential; Future  •  Comprehensive Metabolic Panel; Future  •  Lipid Panel; Future  •  TSH with reflex to Free T4 if abnormal; Future  •  Vitamin B12; Future    Essential hypertension  - controlled  - current regimen: chlorthalidone 50 mg daily, losartan 50 mg twice daily, Toprol XL 50 mg daily     Orders:  •  chlorthalidone (Hygroton) 25 mg tablet; Take 1 tablet (25 mg) by mouth once daily.  •  losartan (Cozaar) 50 mg tablet; Take 1 tablet (50 mg) by mouth 2 times a day.  •  metoprolol succinate XL (Toprol-XL) 50 mg 24 hr tablet; Take 1 tablet (50 mg) by mouth once daily.  •  CBC and Auto Differential; Future  •   Comprehensive Metabolic Panel; Future  •  TSH with reflex to Free T4 if abnormal; Future  •  Vitamin B12; Future    Overactive bladder  - controlled. Continue oxybutynin     Orders:  •  oxyBUTYnin XL (Ditropan XL) 5 mg 24 hr tablet; Take 1 tablet (5 mg) by mouth once daily. Do not crush, chew, or split.  •  CBC and Auto Differential; Future  •  Comprehensive Metabolic Panel; Future  •  TSH with reflex to Free T4 if abnormal; Future  •  Vitamin B12; Future    Essential hypertriglyceridemia  - controlled  -continue atorvastatin      Orders:  •  CBC and Auto Differential; Future  •  Comprehensive Metabolic Panel; Future  •  Lipid Panel; Future  •  TSH with reflex to Free T4 if abnormal; Future  •  Vitamin B12; Future    Anxiety  -feels it is manageable at this point without medication. Will continue to monitor    Orders:  •  CBC and Auto Differential; Future  •  Comprehensive Metabolic Panel; Future  •  TSH with reflex to Free T4 if abnormal; Future  •  Vitamin B12; Future    Arthritis  -continue acetaminophen as needed.    Orders:  •  CBC and Auto Differential; Future  •  Comprehensive Metabolic Panel; Future  •  TSH with reflex to Free T4 if abnormal; Future  •  Vitamin B12; Future    Irritable bowel syndrome with both constipation and diarrhea  -continue miralax as needed     Orders:  •  CBC and Auto Differential; Future  •  Comprehensive Metabolic Panel; Future  •  TSH with reflex to Free T4 if abnormal; Future  •  Vitamin B12; Future    Multilevel degenerative disc disease  -continue acetaminophen as needed    Orders:  •  CBC and Auto Differential; Future  •  Comprehensive Metabolic Panel; Future  •  TSH with reflex to Free T4 if abnormal; Future  •  Vitamin B12; Future    Vitamin D deficiency    Orders:  •  CBC and Auto Differential; Future  •  Comprehensive Metabolic Panel; Future  •  TSH with reflex to Free T4 if abnormal; Future  •  Vitamin D 25-Hydroxy,Total (for eval of Vitamin D levels); Future  •  Vitamin  B12; Future    Prediabetes  - HbA1c 5.9  - Encouraged healthy lifestyle, including adequate exercise and high fiber, low fat and low carb diet.     Orders:  •  CBC and Auto Differential; Future  •  Comprehensive Metabolic Panel; Future  •  TSH with reflex to Free T4 if abnormal; Future  •  Hemoglobin A1C; Future  •  Vitamin B12; Future    Chronic allergic rhinitis  - continue loratadine 10 mg daily   - continue montelukast 10 mg daily     Orders:  •  montelukast (Singulair) 10 mg tablet; Take 1 tablet (10 mg) by mouth once daily at bedtime.    Gastroesophageal reflux disease without esophagitis  -stable. continue famotidine         Tortuous artery  - 12/23/24 MRA head/ neck  --> luminal irregularity, ectasia, and beading of the mid to distal cervical ICAs bilaterally and to a lesser extent possibly involving the distal V2 and V3 segments of the right vertebral artery. These findings are nonspecific but may be seen with FMD. There is no  significant stenosis within the limitations of this examination.  - will be seeing vascular         Encounter for immunization  - recommended the following vaccines at the pharmacy: RSV, COVID-19    Orders:  •  CBC and Auto Differential; Future  •  Comprehensive Metabolic Panel; Future  •  TSH with reflex to Free T4 if abnormal; Future  •  Vitamin B12; Future    Postmenopausal    Orders:  •  XR DEXA bone density; Future

## 2025-05-27 NOTE — ASSESSMENT & PLAN NOTE
- controlled. Continue oxybutynin     Orders:    oxyBUTYnin XL (Ditropan XL) 5 mg 24 hr tablet; Take 1 tablet (5 mg) by mouth once daily. Do not crush, chew, or split.    CBC and Auto Differential; Future    Comprehensive Metabolic Panel; Future    TSH with reflex to Free T4 if abnormal; Future    Vitamin B12; Future

## 2025-05-27 NOTE — ASSESSMENT & PLAN NOTE
- 12/23/24 MRA head/ neck  --> luminal irregularity, ectasia, and beading of the mid to distal cervical ICAs bilaterally and to a lesser extent possibly involving the distal V2 and V3 segments of the right vertebral artery. These findings are nonspecific but may be seen with FMD. There is no  significant stenosis within the limitations of this examination.  - will be seeing vascular

## 2025-05-27 NOTE — ASSESSMENT & PLAN NOTE
- continue loratadine 10 mg daily   - continue montelukast 10 mg daily     Orders:    montelukast (Singulair) 10 mg tablet; Take 1 tablet (10 mg) by mouth once daily at bedtime.

## 2025-05-27 NOTE — ASSESSMENT & PLAN NOTE
- controlled  - current regimen: chlorthalidone 50 mg daily, losartan 50 mg twice daily, Toprol XL 50 mg daily     Orders:    chlorthalidone (Hygroton) 25 mg tablet; Take 1 tablet (25 mg) by mouth once daily.    losartan (Cozaar) 50 mg tablet; Take 1 tablet (50 mg) by mouth 2 times a day.    metoprolol succinate XL (Toprol-XL) 50 mg 24 hr tablet; Take 1 tablet (50 mg) by mouth once daily.    CBC and Auto Differential; Future    Comprehensive Metabolic Panel; Future    TSH with reflex to Free T4 if abnormal; Future    Vitamin B12; Future

## 2025-05-27 NOTE — ASSESSMENT & PLAN NOTE
-continue miralax as needed     Orders:    CBC and Auto Differential; Future    Comprehensive Metabolic Panel; Future    TSH with reflex to Free T4 if abnormal; Future    Vitamin B12; Future

## 2025-06-03 ENCOUNTER — HOSPITAL ENCOUNTER (OUTPATIENT)
Dept: RADIOLOGY | Facility: CLINIC | Age: 80
Discharge: HOME | End: 2025-06-03
Payer: MEDICARE

## 2025-06-03 DIAGNOSIS — Z78.0 POSTMENOPAUSAL: ICD-10-CM

## 2025-06-03 PROCEDURE — 77080 DXA BONE DENSITY AXIAL: CPT

## 2025-06-03 PROCEDURE — 77080 DXA BONE DENSITY AXIAL: CPT | Performed by: RADIOLOGY

## 2025-06-09 ENCOUNTER — OFFICE VISIT (OUTPATIENT)
Dept: CARDIOLOGY | Facility: CLINIC | Age: 80
End: 2025-06-09
Payer: MEDICARE

## 2025-06-09 VITALS
BODY MASS INDEX: 22.09 KG/M2 | HEART RATE: 64 BPM | DIASTOLIC BLOOD PRESSURE: 76 MMHG | WEIGHT: 117 LBS | OXYGEN SATURATION: 97 % | SYSTOLIC BLOOD PRESSURE: 148 MMHG | HEIGHT: 61 IN

## 2025-06-09 DIAGNOSIS — I77.1 TORTUOUS ARTERY: Primary | ICD-10-CM

## 2025-06-09 PROCEDURE — 99202 OFFICE O/P NEW SF 15 MIN: CPT

## 2025-06-09 PROCEDURE — 1159F MED LIST DOCD IN RCRD: CPT | Performed by: INTERNAL MEDICINE

## 2025-06-09 PROCEDURE — 3077F SYST BP >= 140 MM HG: CPT | Performed by: INTERNAL MEDICINE

## 2025-06-09 PROCEDURE — 1126F AMNT PAIN NOTED NONE PRSNT: CPT | Performed by: INTERNAL MEDICINE

## 2025-06-09 PROCEDURE — 99204 OFFICE O/P NEW MOD 45 MIN: CPT | Performed by: INTERNAL MEDICINE

## 2025-06-09 PROCEDURE — 1160F RVW MEDS BY RX/DR IN RCRD: CPT | Performed by: INTERNAL MEDICINE

## 2025-06-09 PROCEDURE — 3078F DIAST BP <80 MM HG: CPT | Performed by: INTERNAL MEDICINE

## 2025-06-09 PROCEDURE — 1036F TOBACCO NON-USER: CPT | Performed by: INTERNAL MEDICINE

## 2025-06-09 ASSESSMENT — PAIN SCALES - GENERAL: PAINLEVEL_OUTOF10: 0-NO PAIN

## 2025-06-09 NOTE — PROGRESS NOTES
"Referred by Carlos Matthews DO      History of Present Illness:  Deborah Boston is a/an 79 y.o. woman with hypertension, hyperlipidemia who developed pulsatile tinnitus; sought care in emergency department and had findings on MRI possibly consistent with fibromuscular dysplasia. She still notices intermittent pulsatile tinnitus. Occasional headaches. No dizziness or lightheadedness. Remote tobacco.     Medical History[1]  Surgical History[2]  Social History[3]  Family History[4]  Current Medications[5]    Physical Examination:  Blood pressure 148/76, pulse 64, height 1.549 m (5' 1\"), weight 53.1 kg (117 lb), SpO2 97%.  General: well-developed, well-nourished, no distress  Head: normocephalic, atraumatic  Eyes: PERRL, anicteric sclerae, no conjunctival injection  ENT: normal oropharynx  Neck: supple, bilateral carotid bruits, no JVD  Lungs: normal respiratory effort, clear to auscultation bilaterally  Heart: regular, normal S1 and S2, no murmurs, rubs or gallops  Abdomen: normal active bowel sounds, soft, non-distended, non-tender  Extremities: no cyanosis or clubbing  Vascular: no edema, pulses 2+ and symmetric  Musculoskeletal: no deformities  Neurological: alert and oriented, no gross neurological deficits  Psychological: normal mood and affect   Skin: warm and dry, no rashes or lesions        Pertinent Labs:    Pertinent Imaging:  MRI 12/23/2024      IMPRESSION:  There is again luminal irregularity, ectasia, and beading of the mid  to distal cervical ICAs bilaterally and to a lesser extent possibly  involving the distal V2 and V3 segments of the right vertebral artery  these findings are nonspecific but may be seen with FMD. There is no  significant stenosis within the limitations of this examination.    Carotid artery duplex ultrasound 10/25/2024  CONCLUSIONS:  Right Carotid: Findings are consistent with less than 50% stenosis of the right proximal internal carotid artery. Right external carotid artery appears " patent with no evidence of stenosis. The right vertebral artery is patent with antegrade flow. No evidence of hemodynamically significant stenosis in the right subclavian artery. Tortuous ICA.  Left Carotid: Findings are consistent with less than 50% stenosis of the left proximal internal carotid artery. Left external carotid artery appears patent with no evidence of stenosis. The left vertebral artery is patent with antegrade flow. No evidence of hemodynamically significant stenosis in the left subclavian artery.     Imaging & Doppler Findings:  Right Plaque Morph: The proximal right internal carotid artery demonstrates calcified plaque. The mid right common carotid artery demonstrates calcified plaque.  Left Plaque Morph: The proximal left internal carotid artery demonstrates smooth and calcified plaque. The distal left common carotid artery demonstrates calcified plaque.    Diagnoses and all orders for this visit:  Tortuous artery (Primary)  Possible fibromuscular dysplasia but I reviewed carotid duplex and all velocities normal. Possibly some mild turbulence but may be due to tortuosity. I think no further imaging at this time. Will see her periodically to check in clinically.    Luz Elena Paredes MD, MS           [1]   Past Medical History:  Diagnosis Date    Acute pain of left knee 11/16/2023    Anesthesia of skin 10/07/2021    Numbness of fingers of both hands    Anesthesia of skin 10/07/2021    Numbness of fingers of both hands    Bilateral leg pain 02/21/2024    Carpal tunnel syndrome, bilateral upper limbs 03/05/2023    Cervicalgia 11/23/2021    Neck pain    Cervicalgia 11/23/2021    Neck pain    Cervicalgia 11/23/2021    Neck pain    Chronic cough 11/26/2024    Disorder of the skin and subcutaneous tissue, unspecified 11/23/2021    Foot lesion    Disorder of the skin and subcutaneous tissue, unspecified 11/23/2021    Foot lesion    Encounter for screening for depression 02/03/2022    Positive screening for  depression on 2-item Patient Health Questionnaire (PHQ-2)    History of colonic polyps 06/26/2023    Neck pain 03/05/2023    Occipital neuralgia 01/03/2020    Occipital neuralgia of left side    Other conditions influencing health status 01/06/2022    History of cough    Other specified cardiac arrhythmias     Sinus arrhythmia    Other specified counseling 05/09/2022    Advanced directives, counseling/discussion    Pain in left foot 10/29/2019    Left foot pain    Pelvic and perineal pain 07/24/2021    Pelvic pain    Personal history of other diseases of the circulatory system     History of hypertension    Personal history of other diseases of the respiratory system 05/09/2022    History of acute sinusitis    Personal history of other diseases of the respiratory system 05/09/2022    History of acute sinusitis    Personal history of other diseases of the respiratory system 05/26/2022    History of paranasal sinus congestion    Personal history of other drug therapy     COVID-19 vaccine series completed    Personal history of other endocrine, nutritional and metabolic disease     History of diabetes mellitus    Personal history of other mental and behavioral disorders     History of depression    Personal history of other specified conditions     History of urinary frequency    Personal history of other specified conditions 10/20/2022    History of urinary incontinence    Personal history of other specified conditions 12/06/2019    History of chest pain    Personal history of other specified conditions 01/19/2022    History of urinary urgency    Personal history of urinary (tract) infections 10/20/2022    History of urinary tract infection    Unspecified abdominal pain 07/07/2021    Abdominal pressure    Unspecified osteoarthritis, unspecified site 11/17/2022    Arthritis    Unspecified osteoarthritis, unspecified site 11/17/2022    Arthritis    Unspecified osteoarthritis, unspecified site 11/17/2022    Arthritis     Unspecified osteoarthritis, unspecified site 2022    Arthritis   [2]   Past Surgical History:  Procedure Laterality Date    CARPAL TUNNEL RELEASE Right 2023    OTHER SURGICAL HISTORY  2019    Shoulder surgery    OTHER SURGICAL HISTORY  2019    Hysterectomy    OTHER SURGICAL HISTORY  2019    Tubal ligation    OTHER SURGICAL HISTORY  2019    Breast biopsy    OTHER SURGICAL HISTORY  2019    Hernia repair    OTHER SURGICAL HISTORY  2022    Carpal tunnel surgery   [3]   Social History  Tobacco Use    Smoking status: Former     Current packs/day: 0.00     Types: Cigarettes     Quit date:      Years since quittin.4    Smokeless tobacco: Never   Vaping Use    Vaping status: Never Used   Substance Use Topics    Alcohol use: Not Currently    Drug use: Never   [4]   Family History  Problem Relation Name Age of Onset    Coronary artery disease Mother      Hypertension Mother      Coronary artery disease Father      Coronary artery disease Sister      Hypertension Sister      Coronary artery disease Brother      Hypertension Brother      Colon cancer Brother     [5]   Current Outpatient Medications   Medication Sig Dispense Refill    albuterol 90 mcg/actuation inhaler USE 2 INHALATIONS BY MOUTH EVERY 6 HOURS IF NEEDED FOR WHEEZING  OR SHORTNESS OF BREATH 36 g 6    aspirin 81 mg EC tablet Take 1 tablet (81 mg) by mouth once daily.      atorvastatin (Lipitor) 40 mg tablet Take 1 tablet (40 mg) by mouth once daily. 100 tablet 3    azelastine (Astelin) 137 mcg (0.1 %) nasal spray Administer 1 spray into each nostril 2 times a day.      chlorthalidone (Hygroton) 25 mg tablet Take 1 tablet (25 mg) by mouth once daily. 100 tablet 3    famotidine (Pepcid) 20 mg tablet Take 1 tablet (20 mg) by mouth 2 times a day. 180 tablet 1    losartan (Cozaar) 50 mg tablet Take 1 tablet (50 mg) by mouth 2 times a day. 200 tablet 3    metoprolol succinate XL (Toprol-XL) 50 mg 24 hr tablet Take 1  tablet (50 mg) by mouth once daily. 100 tablet 3    acetaminophen (Tylenol) 325 mg capsule Take 1 capsule (325 mg) by mouth every 6 hours if needed for mild pain (1 - 3) or moderate pain (4 - 6). (Patient not taking: Reported on 6/9/2025)      cream base no.31, bulk, (Transdermal Pain Base) cream Transdermal Pain Base External Cream date 1/3/2020 [facility] - Apply 1-2 grams topically to affected area every 6-8 hours as needed for pain Refills: 11 Active      EPINEPHrine 0.3 mg/0.3 mL injection syringe Inject 0.3 mL (0.3 mg) into the muscle 1 time if needed for anaphylaxis for up to 1 dose. As Directed 2 each 0    fluocinonide (Lidex) 0.05 % cream Apply topically 2 times a day as needed for irritation or rash. (Patient not taking: Reported on 6/9/2025) 60 g 2    loratadine (Claritin) 10 mg tablet Take 1 tablet (10 mg) by mouth once daily. 90 tablet 1    montelukast (Singulair) 10 mg tablet Take 1 tablet (10 mg) by mouth once daily at bedtime. (Patient not taking: Reported on 6/9/2025) 100 tablet 3    oxyBUTYnin XL (Ditropan XL) 5 mg 24 hr tablet Take 1 tablet (5 mg) by mouth once daily. Do not crush, chew, or split. (Patient not taking: Reported on 6/9/2025) 100 tablet 3    valACYclovir (Valtrex) 1 gram tablet Take 2 tablets (2,000 mg) by mouth every 12 hours. Take as needed for flares (Patient not taking: Reported on 6/9/2025) 4 tablet 5     No current facility-administered medications for this visit.

## 2025-06-30 ENCOUNTER — TELEPHONE (OUTPATIENT)
Facility: CLINIC | Age: 80
End: 2025-06-30
Payer: MEDICARE

## 2025-06-30 NOTE — TELEPHONE ENCOUNTER
----- Message from Carissa ROE sent at 6/30/2025 10:03 AM EDT -----  Regarding: colon recall  Provision recall -     Per chart recall, pt due for repeat colonoscopy  - can you place the order for the screening colonoscopy and send to [facility] .  Thank you.    #Chart reviewed for age, BMI and anticoagulants.     Colon 2020 - repeat 5 years, pt will be 80 7/23/25

## 2025-06-30 NOTE — TELEPHONE ENCOUNTER
Previous colonoscopy in 2020 with no polyps. Based on family history and her previous history of polyps could consider repeat colonoscopy in 5 years (due in 2025), but at this point she is approaching 80 years old and she should discuss this with her PCP to determine if continued screening procedures are indicated.

## 2025-07-08 ENCOUNTER — OFFICE VISIT (OUTPATIENT)
Dept: PRIMARY CARE | Facility: CLINIC | Age: 80
End: 2025-07-08
Payer: MEDICARE

## 2025-07-08 VITALS
HEIGHT: 61 IN | RESPIRATION RATE: 16 BRPM | WEIGHT: 119 LBS | OXYGEN SATURATION: 98 % | HEART RATE: 61 BPM | BODY MASS INDEX: 22.47 KG/M2 | DIASTOLIC BLOOD PRESSURE: 81 MMHG | SYSTOLIC BLOOD PRESSURE: 161 MMHG

## 2025-07-08 DIAGNOSIS — R05.9 COUGH, UNSPECIFIED TYPE: ICD-10-CM

## 2025-07-08 DIAGNOSIS — J01.11 ACUTE RECURRENT FRONTAL SINUSITIS: Primary | ICD-10-CM

## 2025-07-08 DIAGNOSIS — J30.9 CHRONIC ALLERGIC RHINITIS: ICD-10-CM

## 2025-07-08 PROCEDURE — 1159F MED LIST DOCD IN RCRD: CPT | Performed by: FAMILY MEDICINE

## 2025-07-08 PROCEDURE — 3077F SYST BP >= 140 MM HG: CPT | Performed by: FAMILY MEDICINE

## 2025-07-08 PROCEDURE — 1036F TOBACCO NON-USER: CPT | Performed by: FAMILY MEDICINE

## 2025-07-08 PROCEDURE — G2211 COMPLEX E/M VISIT ADD ON: HCPCS | Performed by: FAMILY MEDICINE

## 2025-07-08 PROCEDURE — 99213 OFFICE O/P EST LOW 20 MIN: CPT | Performed by: FAMILY MEDICINE

## 2025-07-08 PROCEDURE — 1160F RVW MEDS BY RX/DR IN RCRD: CPT | Performed by: FAMILY MEDICINE

## 2025-07-08 PROCEDURE — 3079F DIAST BP 80-89 MM HG: CPT | Performed by: FAMILY MEDICINE

## 2025-07-08 RX ORDER — CETIRIZINE HYDROCHLORIDE 10 MG/1
10 TABLET ORAL DAILY
Qty: 90 TABLET | Refills: 1 | Status: SHIPPED | OUTPATIENT
Start: 2025-07-08 | End: 2026-01-04

## 2025-07-08 RX ORDER — DOXYCYCLINE 100 MG/1
100 CAPSULE ORAL 2 TIMES DAILY
Qty: 20 CAPSULE | Refills: 0 | Status: SHIPPED | OUTPATIENT
Start: 2025-07-08 | End: 2025-07-18

## 2025-07-08 RX ORDER — AZELASTINE 1 MG/ML
1 SPRAY, METERED NASAL 2 TIMES DAILY
Qty: 30 ML | Refills: 5 | Status: SHIPPED | OUTPATIENT
Start: 2025-07-08 | End: 2026-01-04

## 2025-07-08 RX ORDER — BENZONATATE 200 MG/1
200 CAPSULE ORAL 3 TIMES DAILY PRN
Qty: 42 CAPSULE | Refills: 0 | Status: SHIPPED | OUTPATIENT
Start: 2025-07-08 | End: 2025-08-07

## 2025-07-08 ASSESSMENT — ENCOUNTER SYMPTOMS
NERVOUS/ANXIOUS: 0
PALPITATIONS: 0
VOMITING: 0
CHEST TIGHTNESS: 0
NUMBNESS: 0
UNEXPECTED WEIGHT CHANGE: 0
SWEATS: 0
FEVER: 0
SINUS PAIN: 0
POLYPHAGIA: 0
SORE THROAT: 0
DIAPHORESIS: 0
FREQUENCY: 0
ADENOPATHY: 0
DYSURIA: 0
COUGH: 1
SHORTNESS OF BREATH: 0
CONFUSION: 0
DIZZINESS: 0
ABDOMINAL PAIN: 0
NAUSEA: 0
DYSPHORIC MOOD: 0
WHEEZING: 0
CHILLS: 1
POLYDIPSIA: 0
LIGHT-HEADEDNESS: 0
HEADACHES: 1
SINUS PRESSURE: 0
DIARRHEA: 0
HEMATURIA: 0
CONSTIPATION: 0

## 2025-07-08 ASSESSMENT — COPD QUESTIONNAIRES: COPD: 0

## 2025-07-08 NOTE — PATIENT INSTRUCTIONS
Deborah Boston ,    Thank you for coming in today. We at [facility] appreciate your trust in our care. If you have any questions or concerns about the care you received today, please do not hesitate to contact us at [phone].    The following instructions were discussed today:    - START doxycycline 100 mg twice daily x 10 days   - START tessalon 200 mg three times daily as needed for cough   - START cetirizine 10 mg daily   - RESTART astelin nasal spray twice daily   - get chest x-ray if no better in 1 week   - return in 2 weeks for nurse visit for blood pressure check   - Follow up 12/1/2025 as scheduled.

## 2025-07-08 NOTE — PROGRESS NOTES
"Subjective   Patient ID: Deborah Boston is a 79 y.o. female who presents for congestion runny nose, cough, headache (X 3 wks or more/).    Cough  This is a new problem. Episode onset: 2-3 weeks ago. The problem has been waxing and waning. The problem occurs every few minutes. The cough is Productive of sputum. Associated symptoms include chills, headaches, nasal congestion and postnasal drip. Pertinent negatives include no chest pain, fever, sore throat, shortness of breath, sweats or wheezing. The symptoms are aggravated by pollens and exercise. She has tried leukotriene antagonists, rest and a beta-agonist inhaler for the symptoms. The treatment provided mild relief. Her past medical history is significant for environmental allergies. There is no history of asthma or COPD.        Review of Systems   Constitutional:  Positive for chills. Negative for diaphoresis, fever and unexpected weight change.   HENT:  Positive for postnasal drip. Negative for congestion, sinus pressure, sinus pain, sneezing and sore throat.    Respiratory:  Positive for cough. Negative for chest tightness, shortness of breath and wheezing.    Cardiovascular:  Negative for chest pain, palpitations and leg swelling.   Gastrointestinal:  Negative for abdominal pain, constipation, diarrhea, nausea and vomiting.   Endocrine: Negative for cold intolerance, heat intolerance, polydipsia, polyphagia and polyuria.   Genitourinary:  Negative for dysuria, frequency, hematuria and urgency.   Allergic/Immunologic: Positive for environmental allergies.   Neurological:  Positive for headaches. Negative for dizziness, syncope, light-headedness and numbness.   Hematological:  Negative for adenopathy.   Psychiatric/Behavioral:  Negative for confusion and dysphoric mood. The patient is not nervous/anxious.        Objective   /81 (BP Location: Left arm, Patient Position: Sitting, BP Cuff Size: Adult)   Pulse 61   Resp 16   Ht (!) 1.549 m (5' 1\")   " Wt 54 kg (119 lb)   SpO2 98%   BMI 22.48 kg/m²     Physical Exam  Vitals and nursing note reviewed.   Constitutional:       General: She is not in acute distress.     Appearance: Normal appearance.   HENT:      Head: Normocephalic and atraumatic.      Nose: Nose normal.   Eyes:      Extraocular Movements: Extraocular movements intact.      Conjunctiva/sclera: Conjunctivae normal.      Pupils: Pupils are equal, round, and reactive to light.   Cardiovascular:      Rate and Rhythm: Normal rate and regular rhythm.      Heart sounds: No murmur heard.     No friction rub. No gallop.   Pulmonary:      Effort: Pulmonary effort is normal.      Breath sounds: Normal breath sounds. No wheezing, rhonchi or rales.   Abdominal:      General: Bowel sounds are normal. There is no distension.      Palpations: Abdomen is soft.      Tenderness: There is no abdominal tenderness.   Musculoskeletal:         General: Normal range of motion.      Cervical back: Normal range of motion and neck supple.   Skin:     General: Skin is warm and dry.   Neurological:      General: No focal deficit present.      Mental Status: She is alert and oriented to person, place, and time.      Deep Tendon Reflexes: Reflexes normal.   Psychiatric:         Mood and Affect: Mood normal.         Behavior: Behavior normal.         Thought Content: Thought content normal.         Judgment: Judgment normal.         Assessment/Plan   Problem List Items Addressed This Visit           ICD-10-CM    Acute recurrent frontal sinusitis - Primary J01.11    - start doxycycline 100 mg twice daily x 10 days   - start tessalon as needed for cough  -  chest x-ray if no better in 1 week  - Recommended plenty of fluids and plenty of rest.   - Call if symptoms worsen or persist.           Relevant Medications    doxycycline (Vibramycin) 100 mg capsule    benzonatate (Tessalon) 200 mg capsule    Chronic allergic rhinitis J30.9    - no relief with loratadine so will change to  cetirizine  - continue montelukast   - restart astelin nasal spray         Relevant Medications    cetirizine (ZyrTEC) 10 mg tablet    azelastine (Astelin) 137 mcg (0.1 %) nasal spray     Other Visit Diagnoses         Codes      Cough, unspecified type     R05.9    Relevant Orders    XR chest 2 views

## 2025-07-08 NOTE — ASSESSMENT & PLAN NOTE
- start doxycycline 100 mg twice daily x 10 days   - start tessalon as needed for cough  -  chest x-ray if no better in 1 week  - Recommended plenty of fluids and plenty of rest.   - Call if symptoms worsen or persist.

## 2025-07-08 NOTE — ASSESSMENT & PLAN NOTE
- no relief with loratadine so will change to cetirizine  - continue montelukast   - restart astelin nasal spray

## 2025-07-08 NOTE — ASSESSMENT & PLAN NOTE
- blood pressure elevated today. She has been sick and has not been walking as much as usual  - current regimen: chlorthalidone 50 mg daily, losartan 50 mg twice daily, Toprol XL 50 mg daily   - return in 2 weeks for nurse visit for blood pressure check

## 2025-07-21 ENCOUNTER — APPOINTMENT (OUTPATIENT)
Dept: PRIMARY CARE | Facility: CLINIC | Age: 80
End: 2025-07-21
Payer: MEDICARE

## 2025-12-01 ENCOUNTER — APPOINTMENT (OUTPATIENT)
Dept: PRIMARY CARE | Facility: CLINIC | Age: 80
End: 2025-12-01
Payer: MEDICARE

## 2026-05-27 ENCOUNTER — APPOINTMENT (OUTPATIENT)
Dept: PRIMARY CARE | Facility: CLINIC | Age: 81
End: 2026-05-27
Payer: MEDICARE